# Patient Record
Sex: FEMALE | Race: WHITE | NOT HISPANIC OR LATINO | Employment: FULL TIME | URBAN - METROPOLITAN AREA
[De-identification: names, ages, dates, MRNs, and addresses within clinical notes are randomized per-mention and may not be internally consistent; named-entity substitution may affect disease eponyms.]

---

## 2017-01-11 ENCOUNTER — OFFICE VISIT (OUTPATIENT)
Dept: PLASTIC SURGERY | Facility: CLINIC | Age: 27
End: 2017-01-11
Payer: COMMERCIAL

## 2017-01-11 VITALS
BODY MASS INDEX: 21.38 KG/M2 | DIASTOLIC BLOOD PRESSURE: 74 MMHG | SYSTOLIC BLOOD PRESSURE: 107 MMHG | TEMPERATURE: 98 F | HEART RATE: 69 BPM | WEIGHT: 116.19 LBS | HEIGHT: 62 IN

## 2017-01-11 DIAGNOSIS — Z09 SURGERY FOLLOW-UP EXAMINATION: Primary | ICD-10-CM

## 2017-01-11 PROCEDURE — 99024 POSTOP FOLLOW-UP VISIT: CPT | Mod: S$GLB,,, | Performed by: SURGERY

## 2017-01-11 PROCEDURE — 99999 PR PBB SHADOW E&M-EST. PATIENT-LVL III: CPT | Mod: PBBFAC,,, | Performed by: SURGERY

## 2017-01-11 NOTE — PROGRESS NOTES
Roxana Perez presents after having a bilateral capsulorrhaphy and free fat   grafting.  She has done very, very well.  She does have one slight soft tissue   contracture up on the left lateral portion of the breast.  When she has her   nipples done in June, which she would like, we may indeed have to go ahead at   that time and go ahead and release that scar contracture.      CHANO/LACHO  dd: 01/11/2017 16:30:48 (CST)  td: 01/12/2017 14:45:38 (CST)  Doc ID   #[id number]  Job ID #[id number]    CC:

## 2017-01-16 ENCOUNTER — PATIENT MESSAGE (OUTPATIENT)
Dept: PLASTIC SURGERY | Facility: CLINIC | Age: 27
End: 2017-01-16

## 2017-02-23 ENCOUNTER — HOSPITAL ENCOUNTER (OUTPATIENT)
Dept: RADIOLOGY | Facility: HOSPITAL | Age: 27
Discharge: HOME | End: 2017-02-23
Attending: OBSTETRICS & GYNECOLOGY
Payer: COMMERCIAL

## 2017-02-23 DIAGNOSIS — Z15.09 BRCA2 POSITIVE: ICD-10-CM

## 2017-02-23 DIAGNOSIS — Z15.01 BRCA2 POSITIVE: ICD-10-CM

## 2017-02-23 PROCEDURE — 76856 US EXAM PELVIC COMPLETE: CPT | Mod: 26,,, | Performed by: RADIOLOGY

## 2017-02-23 PROCEDURE — 76856 US EXAM PELVIC COMPLETE: CPT | Mod: TC

## 2017-02-23 PROCEDURE — 76830 TRANSVAGINAL US NON-OB: CPT | Mod: 26,,, | Performed by: RADIOLOGY

## 2017-03-13 ENCOUNTER — PATIENT MESSAGE (OUTPATIENT)
Dept: PLASTIC SURGERY | Facility: CLINIC | Age: 27
End: 2017-03-13

## 2017-03-18 ENCOUNTER — PATIENT MESSAGE (OUTPATIENT)
Dept: PLASTIC SURGERY | Facility: CLINIC | Age: 27
End: 2017-03-18

## 2017-03-23 ENCOUNTER — PATIENT MESSAGE (OUTPATIENT)
Dept: PLASTIC SURGERY | Facility: CLINIC | Age: 27
End: 2017-03-23

## 2017-03-24 ENCOUNTER — TELEPHONE (OUTPATIENT)
Dept: PLASTIC SURGERY | Facility: CLINIC | Age: 27
End: 2017-03-24

## 2017-03-27 ENCOUNTER — TELEPHONE (OUTPATIENT)
Dept: PLASTIC SURGERY | Facility: CLINIC | Age: 27
End: 2017-03-27

## 2017-03-27 NOTE — TELEPHONE ENCOUNTER
Called home number listed in chart and spoke to her , Duane. Tried calling patients cell but no answered. LM for her to call me back at her convenience.

## 2017-03-28 ENCOUNTER — TELEPHONE (OUTPATIENT)
Dept: PLASTIC SURGERY | Facility: CLINIC | Age: 27
End: 2017-03-28

## 2017-03-28 NOTE — TELEPHONE ENCOUNTER
Spoke with patient. Questions regarding last procedure on 12/12/2017 and bill received for Umbilicoplasty. Have provided her with Olivia Estrada's number, our patient , for further questions as I cannot assist with billing. Will update Dr. Ty Padilla  And Olivia in this phone conversation for further clarification

## 2018-01-25 ENCOUNTER — PATIENT MESSAGE (OUTPATIENT)
Dept: PLASTIC SURGERY | Facility: CLINIC | Age: 28
End: 2018-01-25

## 2018-02-19 ENCOUNTER — HOSPITAL ENCOUNTER (OUTPATIENT)
Dept: RADIOLOGY | Facility: HOSPITAL | Age: 28
Discharge: HOME | End: 2018-02-19
Attending: NURSE PRACTITIONER
Payer: COMMERCIAL

## 2018-02-19 ENCOUNTER — OFFICE VISIT (OUTPATIENT)
Dept: GYNECOLOGIC ONCOLOGY | Facility: CLINIC | Age: 28
End: 2018-02-19
Payer: COMMERCIAL

## 2018-02-19 VITALS
DIASTOLIC BLOOD PRESSURE: 58 MMHG | WEIGHT: 114.88 LBS | BODY MASS INDEX: 21.01 KG/M2 | SYSTOLIC BLOOD PRESSURE: 120 MMHG | HEART RATE: 70 BPM

## 2018-02-19 DIAGNOSIS — Z01.419 WELL WOMAN EXAM: ICD-10-CM

## 2018-02-19 DIAGNOSIS — Z15.09 BRCA2 POSITIVE: Primary | ICD-10-CM

## 2018-02-19 DIAGNOSIS — Z15.09 BRCA2 POSITIVE: ICD-10-CM

## 2018-02-19 DIAGNOSIS — Z15.01 BRCA2 POSITIVE: ICD-10-CM

## 2018-02-19 DIAGNOSIS — Z15.01 BRCA2 POSITIVE: Primary | ICD-10-CM

## 2018-02-19 PROCEDURE — 76856 US EXAM PELVIC COMPLETE: CPT | Mod: 26,,, | Performed by: RADIOLOGY

## 2018-02-19 PROCEDURE — 88175 CYTOPATH C/V AUTO FLUID REDO: CPT

## 2018-02-19 PROCEDURE — 99999 PR PBB SHADOW E&M-EST. PATIENT-LVL III: CPT | Mod: PBBFAC,,, | Performed by: OBSTETRICS & GYNECOLOGY

## 2018-02-19 PROCEDURE — 76830 TRANSVAGINAL US NON-OB: CPT | Mod: 26,,, | Performed by: RADIOLOGY

## 2018-02-19 PROCEDURE — 99395 PREV VISIT EST AGE 18-39: CPT | Mod: S$GLB,,, | Performed by: OBSTETRICS & GYNECOLOGY

## 2018-02-19 PROCEDURE — 76830 TRANSVAGINAL US NON-OB: CPT | Mod: TC

## 2018-02-19 RX ORDER — NORETHINDRONE ACETATE AND ETHINYL ESTRADIOL 1MG-20(21)
1 KIT ORAL DAILY
Qty: 30 TABLET | Refills: 11 | Status: SHIPPED | OUTPATIENT
Start: 2018-02-19 | End: 2018-05-18

## 2018-02-19 NOTE — PROGRESS NOTES
Subjective:      Patient ID: Roxana Perez is a 27 y.o. female.    Chief Complaint: BRCA 2 positive (f/u)      HPI  Patient diagnosed with BRCA2 gene mutation 5/13 in [place]. Has since moved here, her  is in graduate school for music at [employer] and she is in PA school, both doing well. Has been extensively counseled and had breast risk reducing surgery. Sister at age 28 had the procedure performed already.     Had left ovarian cyst on u/s in feb with normal =68. , f/u u/s 4/15 showed comparison is made with February 16, 2015. The uterus measures 6.5 2.3 x 4.5 cm and is unchanged.. The endometrium measures 5 mm which is normal size. Within the uterus there are no mass lesions. . The right ovary measures4.1 x 3.1 by 4.4 cm. there is now a new complex mass measuring 4.5 x 2.8 by 4. 3 cm which is new since previous examination probably represents a hemorrhagic cyst. The present described left large mass lesion has resolved and there is a smaller complex 1.9 x 1.6 x 1.8 cm lesion remaining. Since this has decreased so significantly is is most likely to be benign. The left ovary measures 1.0 by 2.4 x 2.8 cm. . Arterial and venous flow are noted bilaterally. There is no evidence of ascites.      On OCPs.     Pap 5/14/15 normal.     2/23/17 follow up:    normal 12  Pelvic US The uterus measures 7.2 cm in length and 2.5 x 5 point cm in transverse dimensions.  The endometrium is normal thickness at 0.2 cm.   No discrete uterine fibroids identified.  The ovaries are normal in size and appearance.  The right ovary measures 3.3 x 1.3 x 1.7 cm.  The left ovary measures 3.7 x 1.9 x 3.1 cm. LEFT ovarian cyst measuring 3.6 x 1.7 x 2.8 cm, unilocular identified, likely physiologic.    Presents today for follow up. 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 Otherwise feels well.      Review of Systems   Constitutional: Negative for appetite change,  chills, fatigue and fever.   HENT: Negative for mouth sores.    Respiratory: Negative for cough and shortness of breath.    Cardiovascular: Negative for leg swelling.   Gastrointestinal: Negative for abdominal pain, blood in stool, constipation and diarrhea.   Endocrine: Negative for cold intolerance.   Genitourinary: Negative for dysuria and vaginal bleeding.   Musculoskeletal: Negative for myalgias.   Skin: Negative for rash.   Allergic/Immunologic: Negative.    Neurological: Negative for weakness and numbness.   Hematological: Negative for adenopathy. Does not bruise/bleed easily.   Psychiatric/Behavioral: Negative for confusion.       Objective:   Physical Exam:   Constitutional: She is oriented to person, place, and time. She appears well-developed and well-nourished.    HENT:   Head: Normocephalic and atraumatic.    Eyes: EOM are normal. Pupils are equal, round, and reactive to light.    Neck: Normal range of motion. Neck supple. No thyromegaly present.    Cardiovascular: Normal rate, regular rhythm and intact distal pulses.     Pulmonary/Chest: Effort normal and breath sounds normal. No respiratory distress. She has no wheezes.        Abdominal: Soft. Bowel sounds are normal. She exhibits no distension, no ascites and no mass. There is no tenderness.     Genitourinary: Rectum normal, vagina normal and uterus normal. Pelvic exam was performed with patient supine. There is no lesion on the right labia. There is no lesion on the left labia. Uterus is not fixed. Cervix is normal. Right adnexum displays no mass and no fullness. Left adnexum displays no mass and no fullness. Additional cervical findings: pap smear done          Musculoskeletal: Normal range of motion and moves all extremeties.      Lymphadenopathy:     She has no cervical adenopathy.        Right: No inguinal and no supraclavicular adenopathy present.        Left: No inguinal and no supraclavicular adenopathy present.    Neurological: She is alert  and oriented to person, place, and time.    Skin: Skin is warm and dry. No rash noted.    Psychiatric: She has a normal mood and affect.       Assessment:     1. BRCA2 positive    2. Well woman exam        Plan:     Orders Placed This Encounter   Procedures    US Pelvis Complete Non OB         - pap  -   - pelvic US    - RTC 6 months or sooner if needed   - continue OCPs for chemoprophylaxis until ready for childbearing, will change to monophasic formula to try and help with breakthrough bleeding

## 2018-04-10 ENCOUNTER — PATIENT MESSAGE (OUTPATIENT)
Dept: GYNECOLOGIC ONCOLOGY | Facility: CLINIC | Age: 28
End: 2018-04-10

## 2018-05-15 ENCOUNTER — PATIENT MESSAGE (OUTPATIENT)
Dept: GYNECOLOGIC ONCOLOGY | Facility: CLINIC | Age: 28
End: 2018-05-15

## 2018-05-18 ENCOUNTER — TELEPHONE (OUTPATIENT)
Dept: GYNECOLOGIC ONCOLOGY | Facility: CLINIC | Age: 28
End: 2018-05-18

## 2018-05-18 DIAGNOSIS — Z30.9 ENCOUNTER FOR CONTRACEPTIVE MANAGEMENT, UNSPECIFIED TYPE: Primary | ICD-10-CM

## 2018-05-18 RX ORDER — NORETHINDRONE AND ETHINYL ESTRADIOL AND FERROUS FUMARATE 0.4-35(21)
1 KIT ORAL DAILY
Qty: 28 EACH | Refills: 3 | Status: SHIPPED | OUTPATIENT
Start: 2018-05-18 | End: 2018-05-21 | Stop reason: SDUPTHER

## 2018-05-18 NOTE — TELEPHONE ENCOUNTER
Spoke with pt. Per Dr. Dyson, pt advised birth control ha been sent to the pharmacy. She voiced understanding

## 2018-05-18 NOTE — TELEPHONE ENCOUNTER
Spoke with pt. Pt states she will like to have birth control sent to pharmacy on file. Pt advised Dr. Dyson is in surgery, message will be forward to physician. She voiced understanding

## 2018-05-18 NOTE — TELEPHONE ENCOUNTER
----- Message from Kristen Hughes sent at 5/18/2018  8:57 AM CDT -----  Contact: neymar            Name of Who is Calling: neymar      What is the request in detail: pt wants to discuss birth control with the doctor. Please call pt      Can the clinic reply by [facility]: no      What [facility] Back [facility]: [phone]

## 2018-05-18 NOTE — TELEPHONE ENCOUNTER
----- Message from Saira Dyson MD sent at 5/18/2018 11:03 AM CDT -----  Contact: neymar Nick sent.   ----- Message -----  From: Chantelle Zaidi MA  Sent: 5/18/2018   9:43 AM  To: Saira Dyson MD    Good morning,    Spoke with pt. Pt states she will like to have birth control sent to pharmacy on file.       ----- Message -----  From: Kristen Hughes  Sent: 5/18/2018   8:57 AM  To: Kiel Chávez Staff              Name of Who is Calling: neymar      What is the request in detail: pt wants to discuss birth control with the doctor. Please call pt      Can the clinic reply by [facility]: no      What Number to Call Back if not in [facility]: [phone]

## 2018-05-21 DIAGNOSIS — Z30.9 ENCOUNTER FOR CONTRACEPTIVE MANAGEMENT, UNSPECIFIED TYPE: ICD-10-CM

## 2018-05-21 RX ORDER — NORETHINDRONE AND ETHINYL ESTRADIOL AND FERROUS FUMARATE 0.4-35(21)
1 KIT ORAL DAILY
Qty: 28 EACH | Refills: 3 | Status: SHIPPED | OUTPATIENT
Start: 2018-05-21 | End: 2018-08-27 | Stop reason: SDUPTHER

## 2018-08-09 ENCOUNTER — TELEPHONE (OUTPATIENT)
Dept: GYNECOLOGIC ONCOLOGY | Facility: CLINIC | Age: 28
End: 2018-08-09

## 2018-08-24 ENCOUNTER — TELEPHONE (OUTPATIENT)
Dept: GYNECOLOGIC ONCOLOGY | Facility: CLINIC | Age: 28
End: 2018-08-24

## 2018-08-27 ENCOUNTER — OFFICE VISIT (OUTPATIENT)
Dept: GYNECOLOGIC ONCOLOGY | Facility: CLINIC | Age: 28
End: 2018-08-27
Payer: COMMERCIAL

## 2018-08-27 ENCOUNTER — LAB VISIT (OUTPATIENT)
Dept: LAB | Facility: HOSPITAL | Age: 28
End: 2018-08-27
Attending: OBSTETRICS & GYNECOLOGY
Payer: COMMERCIAL

## 2018-08-27 VITALS
BODY MASS INDEX: 20.4 KG/M2 | HEIGHT: 62 IN | DIASTOLIC BLOOD PRESSURE: 57 MMHG | SYSTOLIC BLOOD PRESSURE: 109 MMHG | WEIGHT: 110.88 LBS | HEART RATE: 52 BPM

## 2018-08-27 DIAGNOSIS — Z15.01 BRCA GENE POSITIVE: ICD-10-CM

## 2018-08-27 DIAGNOSIS — Z15.01 BRCA GENE POSITIVE: Primary | ICD-10-CM

## 2018-08-27 DIAGNOSIS — Z15.09 BRCA GENE POSITIVE: ICD-10-CM

## 2018-08-27 DIAGNOSIS — Z15.09 BRCA GENE POSITIVE: Primary | ICD-10-CM

## 2018-08-27 DIAGNOSIS — Z30.9 ENCOUNTER FOR CONTRACEPTIVE MANAGEMENT, UNSPECIFIED TYPE: ICD-10-CM

## 2018-08-27 LAB — CANCER AG125 SERPL-ACNC: 14 U/ML

## 2018-08-27 PROCEDURE — 36415 COLL VENOUS BLD VENIPUNCTURE: CPT

## 2018-08-27 PROCEDURE — 99999 PR PBB SHADOW E&M-EST. PATIENT-LVL III: CPT | Mod: PBBFAC,,, | Performed by: OBSTETRICS & GYNECOLOGY

## 2018-08-27 PROCEDURE — 99214 OFFICE O/P EST MOD 30 MIN: CPT | Mod: S$GLB,,, | Performed by: OBSTETRICS & GYNECOLOGY

## 2018-08-27 PROCEDURE — 3008F BODY MASS INDEX DOCD: CPT | Mod: CPTII,S$GLB,, | Performed by: OBSTETRICS & GYNECOLOGY

## 2018-08-27 PROCEDURE — 86304 IMMUNOASSAY TUMOR CA 125: CPT

## 2018-08-27 RX ORDER — NORETHINDRONE AND ETHINYL ESTRADIOL AND FERROUS FUMARATE 0.4-35(21)
1 KIT ORAL DAILY
Qty: 28 EACH | Refills: 5 | Status: SHIPPED | OUTPATIENT
Start: 2018-08-27 | End: 2019-03-04 | Stop reason: SDUPTHER

## 2018-09-09 NOTE — PROGRESS NOTES
Subjective:      Patient ID: Roxana Perez is a 28 y.o. female.    Chief Complaint: Follow-up      HPI  Patient diagnosed with BRCA2 gene mutation 5/13 in [place]. Moved to [facility], her  is in graduate school for music at [employer] and she is in PA school, both doing well. Has been extensively counseled and had breast risk reducing surgery. Sister at age 28 had the procedure performed already.     Had left ovarian cyst on u/s in feb with normal =75. , f/u u/s 4/15 showed comparison is made with February 16, 2015. The uterus measures 6.5 2.3 x 4.5 cm and is unchanged.. The endometrium measures 5 mm which is normal size. Within the uterus there are no mass lesions. . The right ovary measures4.1 x 3.1 by 4.4 cm. there is now a new complex mass measuring 4.5 x 2.8 by 4. 3 cm which is new since previous examination probably represents a hemorrhagic cyst. The present described left large mass lesion has resolved and there is a smaller complex 1.9 x 1.6 x 1.8 cm lesion remaining. Since this has decreased so significantly is is most likely to be benign. The left ovary measures 1.0 by 2.4 x 2.8 cm. . Arterial and venous flow are noted bilaterally. There is no evidence of ascites.      On OCPs.      Pap 5/14/15 normal.   Pap 2/2018 normal.      2/23/17 follow up:    normal 12  Pelvic US The uterus measures 7.2 cm in length and 2.5 x 5 point cm in transverse dimensions.  The endometrium is normal thickness at 0.2 cm.   No discrete uterine fibroids identified.  The ovaries are normal in size and appearance.  The right ovary measures 3.3 x 1.3 x 1.7 cm.  The left ovary measures 3.7 x 1.9 x 3.1 cm. LEFT ovarian cyst measuring 3.6 x 1.7 x 2.8 cm, unilocular identified, likely physiologic.    S/p bilateral mastectomy     Presents today for follow up. 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 .ksw     Review of Systems   Constitutional: Negative for appetite change, chills,  fatigue and fever.   HENT: Negative for mouth sores.    Respiratory: Negative for cough and shortness of breath.    Cardiovascular: Negative for leg swelling.   Gastrointestinal: Negative for abdominal pain, blood in stool, constipation and diarrhea.   Endocrine: Negative for cold intolerance.   Genitourinary: Negative for dysuria and vaginal bleeding.   Musculoskeletal: Negative for myalgias.   Skin: Negative for rash.   Allergic/Immunologic: Negative.    Neurological: Negative for weakness and numbness.   Hematological: Negative for adenopathy. Does not bruise/bleed easily.   Psychiatric/Behavioral: Negative for confusion.       Objective:   Physical Exam:   Constitutional: She is oriented to person, place, and time. She appears well-developed and well-nourished.    HENT:   Head: Normocephalic and atraumatic.    Eyes: EOM are normal. Pupils are equal, round, and reactive to light.    Neck: Normal range of motion. Neck supple. No thyromegaly present.    Cardiovascular: Normal rate, regular rhythm and intact distal pulses.     Pulmonary/Chest: Effort normal and breath sounds normal. No respiratory distress. She has no wheezes.        Abdominal: Soft. Bowel sounds are normal. She exhibits no distension, no ascites and no mass. There is no tenderness.     Genitourinary: Rectum normal, vagina normal and uterus normal. Pelvic exam was performed with patient supine. There is no lesion on the right labia. There is no lesion on the left labia. Cervix is normal. Right adnexum displays no mass. Left adnexum displays no mass.           Musculoskeletal: Normal range of motion and moves all extremeties.      Lymphadenopathy:     She has no cervical adenopathy.        Right: No inguinal and no supraclavicular adenopathy present.        Left: No inguinal and no supraclavicular adenopathy present.    Neurological: She is alert and oriented to person, place, and time.    Skin: Skin is warm and dry. No rash noted.    Psychiatric:  She has a normal mood and affect.       Assessment:     1. BRCA gene positive    2. Encounter for contraceptive management, unspecified type        Plan:     Orders Placed This Encounter   Procedures    US Pelvis Comp with Transvag NON-OB (xpd)         Plan for continued surveillance.   OCPs for chemoprophylaxis while not pursuing fertility.   RTC 6 months with  and US or sooner if needed.     I spent approximately 25 minutes reviewing the available records and evaluating the patient, out of which over 50% of the time was spent face to face with the patient in counseling and coordinating this patient's care.

## 2018-09-11 ENCOUNTER — HOSPITAL ENCOUNTER (OUTPATIENT)
Dept: RADIOLOGY | Facility: HOSPITAL | Age: 28
Discharge: HOME | End: 2018-09-11
Attending: OBSTETRICS & GYNECOLOGY
Payer: COMMERCIAL

## 2018-09-11 DIAGNOSIS — Z15.09 BRCA GENE POSITIVE: ICD-10-CM

## 2018-09-11 DIAGNOSIS — Z15.01 BRCA GENE POSITIVE: ICD-10-CM

## 2018-09-11 PROCEDURE — 76830 TRANSVAGINAL US NON-OB: CPT | Mod: TC

## 2018-09-11 PROCEDURE — 76856 US EXAM PELVIC COMPLETE: CPT | Mod: 26,,, | Performed by: RADIOLOGY

## 2018-09-11 PROCEDURE — 76830 TRANSVAGINAL US NON-OB: CPT | Mod: 26,,, | Performed by: RADIOLOGY

## 2018-10-04 ENCOUNTER — PATIENT MESSAGE (OUTPATIENT)
Dept: PLASTIC SURGERY | Facility: CLINIC | Age: 28
End: 2018-10-04

## 2018-10-17 ENCOUNTER — OFFICE VISIT (OUTPATIENT)
Dept: PLASTIC SURGERY | Facility: CLINIC | Age: 28
End: 2018-10-17
Payer: COMMERCIAL

## 2018-10-17 VITALS
BODY MASS INDEX: 21.51 KG/M2 | HEART RATE: 69 BPM | TEMPERATURE: 98 F | RESPIRATION RATE: 16 BRPM | DIASTOLIC BLOOD PRESSURE: 69 MMHG | SYSTOLIC BLOOD PRESSURE: 105 MMHG | WEIGHT: 116.88 LBS | HEIGHT: 62 IN

## 2018-10-17 DIAGNOSIS — Z85.3 PERSONAL HISTORY OF MALIGNANT NEOPLASM OF BREAST: Primary | ICD-10-CM

## 2018-10-17 PROCEDURE — 99999 PR PBB SHADOW E&M-EST. PATIENT-LVL III: CPT | Mod: PBBFAC,,, | Performed by: PHYSICIAN ASSISTANT

## 2018-10-17 PROCEDURE — 99213 OFFICE O/P EST LOW 20 MIN: CPT | Mod: S$GLB,,, | Performed by: PHYSICIAN ASSISTANT

## 2018-10-17 PROCEDURE — 3008F BODY MASS INDEX DOCD: CPT | Mod: CPTII,S$GLB,, | Performed by: PHYSICIAN ASSISTANT

## 2018-10-17 NOTE — PROGRESS NOTES
[facility]oxana Perez [facility] for a follow up visit status post B breast reconstruction following B mastectoym secondary to +BRCA mutation.     Review of patient's allergies indicates:   Allergen Reactions    Bactrim [sulfamethoxazole-trimethoprim] Hives    Clindamycin Hives     Current Outpatient Medications on File Prior to Visit   Medication Sig Dispense Refill    LACTOBACILLUS ACIDOPHILUS (PROBIOTIC ORAL) Take 2 capsules by mouth nightly.      noreth-ethinyl estradiol-iron 0.4mg-35mcg(21) and 75 mg (7) Chew Take 1 tablet by mouth once daily. 28 each 5     No current facility-administered medications on file prior to visit.      Patient Active Problem List   Diagnosis    Chromosomal hereditary disorder    Breast cancer screening, high risk patient    BRCA2 positive    Pre-op testing    Encounter for breast reconstruction following mastectomy    H/O mastectomy    Birth control    BRCA gene positive    Well woman exam     Past Surgical History:   Procedure Laterality Date    BREAST SURGERY  12/07/2015    becca. breast-Mastectomy & Exchange Implant Breast Implant    CAPSULORRHAPHY Bilateral 12/12/2016    [facility] OR 2ND FLR    EXCHANGE IMPLANT-BREAST Bilateral Implant Exchange 350-450cc x3 (Style20) 2hrs Bilateral 5/16/2016    Performed by [facility]illa MD at [facility] OR 2ND FLR    INSERTION-TISSUE EXPANDER Bilateral 12/7/2015    Performed by [facility] MD at [facility] OR 2ND FLR    LIPOSUCTION WITH FAT TRANSFER  becca breast Bilateral 12/12/2016    Performed [facility] at [facility] OR 2ND FLR    MASTECTOMY-BREAST-BILATERAL Bilateral 12/7/2015    Performed by Hira Jean-Baptiste MD at [facility] OR 2ND FLR    MOUTH SURGERY      wisdom teeth extracted    RECONSTRUCTION-BREAST Bilateral 12/7/2015    Performed [facility] at [facility] OR 2ND FLR    REPAIR-HERNIA-UMBILICAL N/A 5/16/2016     Performed by [facility]illa MD at [facility] OR [address] [place] [address]     PHYSICAL EXAMINATION  Vitals:    10/17/18 1053   BP: 105/69   Pulse: 69   Resp: 16   Temp: 98.4 °F (36.9 °C)     WD WN NAD  VSS  Normal resp effort  R breast - mastectomy incisions well healed, implants soft/intact, surgically absent nipple  L Breast - mastectomy incisions well healed, implants soft/intact, surgically absent nipple      ASSESSMENT/PLAN  28 y.o. F s/p B implant based breast reconstruction  - Doing well, no issues. Breast are soft and symmetric  - Long discussion regarding nipple reconstruction vs 3D nipple tattoo. Patient would like to consider her options and get back with us regarding her decision  - Patient to send message via [facility].     All questions were answered. The patient was advised to call the clinic with any questions or concerns prior to their next visit.

## 2018-11-02 ENCOUNTER — PATIENT MESSAGE (OUTPATIENT)
Dept: PLASTIC SURGERY | Facility: CLINIC | Age: 28
End: 2018-11-02

## 2019-02-20 ENCOUNTER — TELEPHONE (OUTPATIENT)
Dept: GYNECOLOGIC ONCOLOGY | Facility: CLINIC | Age: 29
End: 2019-02-20

## 2019-03-01 ENCOUNTER — TELEPHONE (OUTPATIENT)
Dept: GYNECOLOGIC ONCOLOGY | Facility: CLINIC | Age: 29
End: 2019-03-01

## 2019-03-04 ENCOUNTER — OFFICE VISIT (OUTPATIENT)
Dept: GYNECOLOGIC ONCOLOGY | Facility: CLINIC | Age: 29
End: 2019-03-04
Payer: COMMERCIAL

## 2019-03-04 ENCOUNTER — LAB VISIT (OUTPATIENT)
Dept: LAB | Facility: HOSPITAL | Age: 29
End: 2019-03-04
Attending: OBSTETRICS & GYNECOLOGY
Payer: COMMERCIAL

## 2019-03-04 VITALS
DIASTOLIC BLOOD PRESSURE: 64 MMHG | WEIGHT: 117.06 LBS | SYSTOLIC BLOOD PRESSURE: 118 MMHG | HEIGHT: 62 IN | HEART RATE: 68 BPM | BODY MASS INDEX: 21.54 KG/M2

## 2019-03-04 DIAGNOSIS — Z15.01 BRCA POSITIVE: Primary | ICD-10-CM

## 2019-03-04 DIAGNOSIS — Z15.09 BRCA POSITIVE: ICD-10-CM

## 2019-03-04 DIAGNOSIS — Z15.09 BRCA2 POSITIVE: ICD-10-CM

## 2019-03-04 DIAGNOSIS — Z15.09 BRCA POSITIVE: Primary | ICD-10-CM

## 2019-03-04 DIAGNOSIS — Z15.01 BRCA2 POSITIVE: ICD-10-CM

## 2019-03-04 DIAGNOSIS — Z30.9 ENCOUNTER FOR CONTRACEPTIVE MANAGEMENT, UNSPECIFIED TYPE: ICD-10-CM

## 2019-03-04 DIAGNOSIS — Z15.01 BRCA POSITIVE: ICD-10-CM

## 2019-03-04 LAB — CANCER AG125 SERPL-ACNC: 11 U/ML

## 2019-03-04 PROCEDURE — 86304 IMMUNOASSAY TUMOR CA 125: CPT

## 2019-03-04 PROCEDURE — 99999 PR PBB SHADOW E&M-EST. PATIENT-LVL III: CPT | Mod: PBBFAC,,, | Performed by: OBSTETRICS & GYNECOLOGY

## 2019-03-04 PROCEDURE — 99999 PR PBB SHADOW E&M-EST. PATIENT-LVL III: ICD-10-PCS | Mod: PBBFAC,,, | Performed by: OBSTETRICS & GYNECOLOGY

## 2019-03-04 PROCEDURE — 99214 PR OFFICE/OUTPT VISIT, EST, LEVL IV, 30-39 MIN: ICD-10-PCS | Mod: S$GLB,,, | Performed by: OBSTETRICS & GYNECOLOGY

## 2019-03-04 PROCEDURE — 36415 COLL VENOUS BLD VENIPUNCTURE: CPT

## 2019-03-04 PROCEDURE — 3008F PR BODY MASS INDEX (BMI) DOCUMENTED: ICD-10-PCS | Mod: CPTII,S$GLB,, | Performed by: OBSTETRICS & GYNECOLOGY

## 2019-03-04 PROCEDURE — 3008F BODY MASS INDEX DOCD: CPT | Mod: CPTII,S$GLB,, | Performed by: OBSTETRICS & GYNECOLOGY

## 2019-03-04 PROCEDURE — 99214 OFFICE O/P EST MOD 30 MIN: CPT | Mod: S$GLB,,, | Performed by: OBSTETRICS & GYNECOLOGY

## 2019-03-04 RX ORDER — NORETHINDRONE AND ETHINYL ESTRADIOL AND FERROUS FUMARATE 0.4-35(21)
1 KIT ORAL DAILY
Qty: 28 EACH | Refills: 5 | Status: SHIPPED | OUTPATIENT
Start: 2019-03-04 | End: 2019-03-25

## 2019-03-04 NOTE — PROGRESS NOTES
Subjective:      Patient ID: Roxana Perez is a 28 y.o. female.    Chief Complaint: BRCA 2 gene positive (6 mth )      HPI  Patient diagnosed with BRCA2 gene mutation 5/13 in [place]. Moved to [facility], her  is in graduate school for music at [employer] and she is in PA school, both doing well. Has been extensively counseled and had breast risk reducing surgery. Sister at age 28 had the procedure performed already.     Had left ovarian cyst on u/s in feb with normal =93. , f/u u/s 4/15 showed comparison is made with February 16, 2015. The uterus measures 6.5 2.3 x 4.5 cm and is unchanged.. The endometrium measures 5 mm which is normal size. Within the uterus there are no mass lesions. . The right ovary measures4.1 x 3.1 by 4.4 cm. there is now a new complex mass measuring 4.5 x 2.8 by 4. 3 cm which is new since previous examination probably represents a hemorrhagic cyst. The present described left large mass lesion has resolved and there is a smaller complex 1.9 x 1.6 x 1.8 cm lesion remaining. Since this has decreased so significantly is is most likely to be benign. The left ovary measures 1.0 by 2.4 x 2.8 cm. . Arterial and venous flow are noted bilaterally. There is no evidence of ascites.      On OCPs.      Pap 5/14/15 normal.   Pap 2/2018 normal.      2/23/17 follow up:    normal 12  Pelvic US The uterus measures 7.2 cm in length and 2.5 x 5 point cm in transverse dimensions.  The endometrium is normal thickness at 0.2 cm.   No discrete uterine fibroids identified.  The ovaries are normal in size and appearance.  The right ovary measures 3.3 x 1.3 x 1.7 cm.  The left ovary measures 3.7 x 1.9 x 3.1 cm. LEFT ovarian cyst measuring 3.6 x 1.7 x 2.8 cm, unilocular identified, likely physiologic.     S/p bilateral mastectomy  8/2018 follow up:   normal 14   Pelvic US  Impression     No sonographic abnormality.     Presents today for follow up. 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  Moved to [facility]. Continues on OCPs.   Review of Systems   Constitutional: Negative for appetite change, chills, fatigue and fever.   HENT: Negative for mouth sores.    Respiratory: Negative for cough and shortness of breath.    Cardiovascular: Negative for leg swelling.   Gastrointestinal: Negative for abdominal pain, blood in stool, constipation and diarrhea.   Endocrine: Negative for cold intolerance.   Genitourinary: Negative for dysuria and vaginal bleeding.   Musculoskeletal: Negative for myalgias.   Skin: Negative for rash.   Allergic/Immunologic: Negative.    Neurological: Negative for weakness and numbness.   Hematological: Negative for adenopathy. Does not bruise/bleed easily.   Psychiatric/Behavioral: Negative for confusion.       Objective:   Physical Exam:   Constitutional: She is oriented to person, place, and time. She appears well-developed and well-nourished.    HENT:   Head: Normocephalic and atraumatic.    Eyes: EOM are normal. Pupils are equal, round, and reactive to light.    Neck: Normal range of motion. Neck supple. No thyromegaly present.    Cardiovascular: Normal rate, regular rhythm and intact distal pulses.     Pulmonary/Chest: Effort normal and breath sounds normal. No respiratory distress. She has no wheezes.        Abdominal: Soft. Bowel sounds are normal. She exhibits no distension, no ascites and no mass. There is no tenderness.     Genitourinary: Rectum normal, vagina normal and uterus normal. Pelvic exam was performed with patient supine. There is no lesion on the right labia. There is no lesion on the left labia. Cervix is normal. Right adnexum displays no mass. Left adnexum displays no mass.           Musculoskeletal: Normal range of motion and moves all extremeties.      Lymphadenopathy:     She has no cervical adenopathy.        Right: No inguinal and no supraclavicular adenopathy present.        Left: No inguinal and no supraclavicular adenopathy present.    Neurological: She  is alert and oriented to person, place, and time.    Skin: Skin is warm and dry. No rash noted.    Psychiatric: She has a normal mood and affect.       Assessment:     1. BRCA positive    2. Encounter for contraceptive management, unspecified type    3. BRCA2 positive        Plan:        Plan for continued surveillance.  and pelvic US ordered today.   OCPs for chemoprophylaxis while not pursuing fertility.   RTC 6 months with  and US or sooner if needed.

## 2019-03-13 ENCOUNTER — HOSPITAL ENCOUNTER (OUTPATIENT)
Dept: RADIOLOGY | Facility: HOSPITAL | Age: 29
Discharge: HOME | End: 2019-03-13
Attending: OBSTETRICS & GYNECOLOGY
Payer: COMMERCIAL

## 2019-03-13 DIAGNOSIS — Z15.01 BRCA POSITIVE: ICD-10-CM

## 2019-03-13 DIAGNOSIS — Z15.09 BRCA POSITIVE: ICD-10-CM

## 2019-03-13 PROCEDURE — 76830 US PELVIS COMP WITH TRANSVAG NON-OB (XPD): ICD-10-PCS | Mod: 26,,, | Performed by: RADIOLOGY

## 2019-03-13 PROCEDURE — 76856 US PELVIS COMP WITH TRANSVAG NON-OB (XPD): ICD-10-PCS | Mod: 26,,, | Performed by: RADIOLOGY

## 2019-03-13 PROCEDURE — 76830 TRANSVAGINAL US NON-OB: CPT | Mod: TC

## 2019-03-13 PROCEDURE — 76856 US EXAM PELVIC COMPLETE: CPT | Mod: 26,,, | Performed by: RADIOLOGY

## 2019-03-13 PROCEDURE — 76830 TRANSVAGINAL US NON-OB: CPT | Mod: 26,,, | Performed by: RADIOLOGY

## 2019-03-25 ENCOUNTER — TELEPHONE (OUTPATIENT)
Dept: GYNECOLOGIC ONCOLOGY | Facility: CLINIC | Age: 29
End: 2019-03-25

## 2019-03-25 ENCOUNTER — TELEPHONE (OUTPATIENT)
Dept: GYNECOLOGIC ONCOLOGY | Facility: HOSPITAL | Age: 29
End: 2019-03-25

## 2019-03-25 DIAGNOSIS — Z30.9 ENCOUNTER FOR CONTRACEPTIVE MANAGEMENT, UNSPECIFIED TYPE: ICD-10-CM

## 2019-03-25 RX ORDER — NORETHINDRONE AND ETHINYL ESTRADIOL AND FERROUS FUMARATE 0.4-35(21)
1 KIT ORAL DAILY
Qty: 28 EACH | Refills: 5 | Status: SHIPPED | OUTPATIENT
Start: 2019-03-25 | End: 2019-09-08 | Stop reason: SDUPTHER

## 2019-03-25 NOTE — TELEPHONE ENCOUNTER
----- Message from Chantelle Zaidi MA sent at 3/25/2019  9:15 AM CDT -----  Pharmacy confirmed with pt.      ----- Message -----  From: Luna Ambrocio  Sent: 3/25/2019   8:57 AM  To: Kiel Chávez Staff    Name of Who is Calling: #MARCELLUSANGELINA [[id number]]    What is the request in detail: Pt wants birth control changed to [id number] - [place] - [address]    Can the clinic reply by [facility]:   No       What Number to Call Back if not in [facility]: [phone]

## 2019-03-25 NOTE — TELEPHONE ENCOUNTER
----- Message from Luna Ambrocio sent at 3/25/2019  8:57 AM CDT -----  Name of Who is Calling: #ANGELINA GERMAIN [[id number]]    What is the request in detail: Pt [facility] control [id number] [place] [facility] [address]    Can the clinic reply by [facility]:   No       What Number to Call Back if not in [facility]: [phone]

## 2019-03-25 NOTE — TELEPHONE ENCOUNTER
----- Message from Martha Monzon NP sent at 3/25/2019 11:45 AM CDT -----  Done, thanks    ----- Message -----  From: Chantelle Zaidi MA  Sent: 3/25/2019   9:15 AM  To: Martha Monzon NP    Pharmacy confirmed with pt.      ----- Message -----  From: Luna Ambrocio  Sent: 3/25/2019   8:57 AM  To: Kiel Chávez Staff    Name of Who is Calling: ANGELINA ALONZO [[id number]]    What is the request in detail: Pt wants birth control changed to [id number] - [place], [place] - [address]    Can the clinic reply by [facility]:   No       What Number to Call Back if not in [facility]: [phone]

## 2019-03-25 NOTE — TELEPHONE ENCOUNTER
Spoke with pt. Pt confirmed pharmacy where to send birth control. Pt informed message will be forward to Martha pierce, Dr. Dyson is not in clinic.

## 2019-09-03 ENCOUNTER — OFFICE VISIT (OUTPATIENT)
Dept: FAMILY MEDICINE | Facility: CLINIC | Age: 29
End: 2019-09-03
Attending: FAMILY MEDICINE
Payer: COMMERCIAL

## 2019-09-03 VITALS
BODY MASS INDEX: 21.57 KG/M2 | SYSTOLIC BLOOD PRESSURE: 102 MMHG | HEART RATE: 53 BPM | WEIGHT: 117.19 LBS | OXYGEN SATURATION: 98 % | DIASTOLIC BLOOD PRESSURE: 70 MMHG | HEIGHT: 62 IN

## 2019-09-03 DIAGNOSIS — Z71.84 ENCOUNTER FOR COUNSELING FOR TRAVEL: ICD-10-CM

## 2019-09-03 DIAGNOSIS — Z23 NEED FOR VACCINATION: ICD-10-CM

## 2019-09-03 DIAGNOSIS — Z00.00 ANNUAL PHYSICAL EXAM: Primary | ICD-10-CM

## 2019-09-03 LAB
BILIRUB SERPL-MCNC: NORMAL MG/DL
BLOOD URINE, POC: NORMAL
COLOR, POC UA: YELLOW
GLUCOSE UR QL STRIP: NORMAL
KETONES UR QL STRIP: NORMAL
LEUKOCYTE ESTERASE URINE, POC: NORMAL
NITRITE, POC UA: NORMAL
PH, POC UA: 7
PROTEIN, POC: NORMAL
SPECIFIC GRAVITY, POC UA: 1
UROBILINOGEN, POC UA: NORMAL

## 2019-09-03 PROCEDURE — 81001 POCT URINALYSIS, DIPSTICK OR TABLET REAGENT, AUTOMATED, WITH MICROSCOP: ICD-10-PCS | Mod: S$GLB,,, | Performed by: FAMILY MEDICINE

## 2019-09-03 PROCEDURE — 90471 TDAP VACCINE GREATER THAN OR EQUAL TO 7YO IM: ICD-10-PCS | Mod: S$GLB,,, | Performed by: FAMILY MEDICINE

## 2019-09-03 PROCEDURE — 99999 PR PBB SHADOW E&M-EST. PATIENT-LVL IV: CPT | Mod: PBBFAC,,, | Performed by: FAMILY MEDICINE

## 2019-09-03 PROCEDURE — 81001 URINALYSIS AUTO W/SCOPE: CPT | Mod: S$GLB,,, | Performed by: FAMILY MEDICINE

## 2019-09-03 PROCEDURE — 90471 IMMUNIZATION ADMIN: CPT | Mod: S$GLB,,, | Performed by: FAMILY MEDICINE

## 2019-09-03 PROCEDURE — 90715 TDAP VACCINE 7 YRS/> IM: CPT | Mod: S$GLB,,, | Performed by: FAMILY MEDICINE

## 2019-09-03 PROCEDURE — 99395 PR PREVENTIVE VISIT,EST,18-39: ICD-10-PCS | Mod: 25,S$GLB,, | Performed by: FAMILY MEDICINE

## 2019-09-03 PROCEDURE — 99999 PR PBB SHADOW E&M-EST. PATIENT-LVL IV: ICD-10-PCS | Mod: PBBFAC,,, | Performed by: FAMILY MEDICINE

## 2019-09-03 PROCEDURE — 99395 PREV VISIT EST AGE 18-39: CPT | Mod: 25,S$GLB,, | Performed by: FAMILY MEDICINE

## 2019-09-03 PROCEDURE — 90715 TDAP VACCINE GREATER THAN OR EQUAL TO 7YO IM: ICD-10-PCS | Mod: S$GLB,,, | Performed by: FAMILY MEDICINE

## 2019-09-03 NOTE — PROGRESS NOTES
Patient was given Tdap IM in Right Deltoid as per orders from MD. Aseptic tech used and pt tolerated well. Pt was monitored for 15 mins with no reaction noted.

## 2019-09-05 NOTE — PROGRESS NOTES
Subjective:       Patient ID: Roxana Perez is a 29 y.o. female.    Chief Complaint: Annual Exam    HPI   Pt is here for annual exam pt is well no sob/cp no change in bowel habits no brbpr   Review of Systems   Constitutional: Negative for activity change, chills, diaphoresis, fatigue, fever and unexpected weight change.   HENT: Negative for congestion, ear discharge, ear pain, hearing loss, postnasal drip, rhinorrhea, sinus pressure, sneezing, sore throat, trouble swallowing and voice change.    Eyes: Negative for photophobia, discharge, redness, itching and visual disturbance.   Respiratory: Negative for cough, chest tightness, shortness of breath and wheezing.    Cardiovascular: Negative for chest pain, palpitations and leg swelling.   Gastrointestinal: Negative for abdominal pain, anal bleeding, blood in stool, constipation, diarrhea, nausea, rectal pain and vomiting.   Endocrine: Negative for polydipsia and polyuria.   Genitourinary: Negative for difficulty urinating, dyspareunia, dysuria, flank pain, frequency, hematuria, menstrual problem, pelvic pain, urgency, vaginal bleeding and vaginal discharge.   Musculoskeletal: Negative for arthralgias, back pain, joint swelling and neck pain.   Skin: Negative for color change and rash.   Neurological: Negative for dizziness, speech difficulty, weakness, light-headedness, numbness and headaches.   Hematological: Does not bruise/bleed easily.   Psychiatric/Behavioral: Negative for agitation, confusion, decreased concentration, dysphoric mood, sleep disturbance and suicidal ideas. The patient is not nervous/anxious.        Objective:      Physical Exam   Constitutional: She appears well-developed and well-nourished.   HENT:   Head: Normocephalic and atraumatic.   Right Ear: External ear normal.   Left Ear: External ear normal.   Nose: Nose normal.   Mouth/Throat: Oropharynx is clear and moist.   Eyes: Pupils are equal, round, and reactive to light. Conjunctivae and  "EOM are normal. Right eye exhibits no discharge. Left eye exhibits no discharge.   Neck: Normal range of motion. Neck supple. No thyromegaly present.   Cardiovascular: Normal rate and regular rhythm. Exam reveals no gallop.   Pulmonary/Chest: Effort normal and breath sounds normal. She has no wheezes. She has no rales.   Abdominal: Soft. Bowel sounds are normal. She exhibits no distension. There is no tenderness. There is no rebound and no guarding.   Genitourinary:   Genitourinary Comments: declined   Musculoskeletal: Normal range of motion. She exhibits no edema or tenderness.   Lymphadenopathy:     She has no cervical adenopathy.   Neurological: She is alert. No cranial nerve deficit. She exhibits normal muscle tone. Coordination normal.   Skin: Skin is warm and dry. No rash noted. No erythema.   Psychiatric: She has a normal mood and affect. Her behavior is normal. Judgment and thought content normal.       Assessment:       1. Annual physical exam    2. Encounter for counseling for travel    3. Need for vaccination        Plan:     orders cmp lipid tsh cbc ua  Cont meds  Low fat diet  Graded exercise  rtc annually and prn    Health maintenance  Pap with gyn  Breast exam with pap  Lipid ordered  Flu in fall  Tetanus q 10 years       "This note will not be shared with the patient."   "

## 2019-09-06 ENCOUNTER — TELEPHONE (OUTPATIENT)
Dept: FAMILY MEDICINE | Facility: CLINIC | Age: 29
End: 2019-09-06

## 2019-09-06 ENCOUNTER — LAB VISIT (OUTPATIENT)
Dept: LAB | Facility: HOSPITAL | Age: 29
End: 2019-09-06
Attending: FAMILY MEDICINE
Payer: COMMERCIAL

## 2019-09-06 ENCOUNTER — PATIENT MESSAGE (OUTPATIENT)
Dept: FAMILY MEDICINE | Facility: CLINIC | Age: 29
End: 2019-09-06

## 2019-09-06 DIAGNOSIS — Z00.00 ANNUAL PHYSICAL EXAM: ICD-10-CM

## 2019-09-06 DIAGNOSIS — Z71.84 ENCOUNTER FOR COUNSELING FOR TRAVEL: ICD-10-CM

## 2019-09-06 DIAGNOSIS — Z23 NEED FOR VACCINATION: ICD-10-CM

## 2019-09-06 LAB
ALBUMIN SERPL BCP-MCNC: 3.9 G/DL (ref 3.5–5.2)
ALP SERPL-CCNC: 41 U/L (ref 55–135)
ALT SERPL W/O P-5'-P-CCNC: 15 U/L (ref 10–44)
ANION GAP SERPL CALC-SCNC: 8 MMOL/L (ref 8–16)
AST SERPL-CCNC: 17 U/L (ref 10–40)
BASOPHILS # BLD AUTO: 0.03 K/UL (ref 0–0.2)
BASOPHILS NFR BLD: 0.6 % (ref 0–1.9)
BILIRUB SERPL-MCNC: 0.4 MG/DL (ref 0.1–1)
BUN SERPL-MCNC: 7 MG/DL (ref 6–20)
CALCIUM SERPL-MCNC: 9.1 MG/DL (ref 8.7–10.5)
CHLORIDE SERPL-SCNC: 107 MMOL/L (ref 95–110)
CHOLEST SERPL-MCNC: 152 MG/DL (ref 120–199)
CHOLEST/HDLC SERPL: 2.2 {RATIO} (ref 2–5)
CO2 SERPL-SCNC: 25 MMOL/L (ref 23–29)
CREAT SERPL-MCNC: 0.7 MG/DL (ref 0.5–1.4)
DIFFERENTIAL METHOD: ABNORMAL
EOSINOPHIL # BLD AUTO: 0.3 K/UL (ref 0–0.5)
EOSINOPHIL NFR BLD: 5.3 % (ref 0–8)
ERYTHROCYTE [DISTWIDTH] IN BLOOD BY AUTOMATED COUNT: 12 % (ref 11.5–14.5)
EST. GFR  (AFRICAN AMERICAN): >60 ML/MIN/1.73 M^2
EST. GFR  (NON AFRICAN AMERICAN): >60 ML/MIN/1.73 M^2
GLUCOSE SERPL-MCNC: 76 MG/DL (ref 70–110)
HCT VFR BLD AUTO: 43.1 % (ref 37–48.5)
HDLC SERPL-MCNC: 70 MG/DL (ref 40–75)
HDLC SERPL: 46.1 % (ref 20–50)
HGB BLD-MCNC: 13.9 G/DL (ref 12–16)
IMM GRANULOCYTES # BLD AUTO: 0.01 K/UL (ref 0–0.04)
IMM GRANULOCYTES NFR BLD AUTO: 0.2 % (ref 0–0.5)
LDLC SERPL CALC-MCNC: 75.2 MG/DL (ref 63–159)
LYMPHOCYTES # BLD AUTO: 1.7 K/UL (ref 1–4.8)
LYMPHOCYTES NFR BLD: 34.8 % (ref 18–48)
MCH RBC QN AUTO: 31.1 PG (ref 27–31)
MCHC RBC AUTO-ENTMCNC: 32.3 G/DL (ref 32–36)
MCV RBC AUTO: 96 FL (ref 82–98)
MONOCYTES # BLD AUTO: 0.4 K/UL (ref 0.3–1)
MONOCYTES NFR BLD: 7.8 % (ref 4–15)
NEUTROPHILS # BLD AUTO: 2.5 K/UL (ref 1.8–7.7)
NEUTROPHILS NFR BLD: 51.3 % (ref 38–73)
NONHDLC SERPL-MCNC: 82 MG/DL
NRBC BLD-RTO: 0 /100 WBC
PLATELET # BLD AUTO: 195 K/UL (ref 150–350)
PMV BLD AUTO: 11.3 FL (ref 9.2–12.9)
POTASSIUM SERPL-SCNC: 4.3 MMOL/L (ref 3.5–5.1)
PROT SERPL-MCNC: 6.9 G/DL (ref 6–8.4)
RBC # BLD AUTO: 4.47 M/UL (ref 4–5.4)
SODIUM SERPL-SCNC: 140 MMOL/L (ref 136–145)
TRIGL SERPL-MCNC: 34 MG/DL (ref 30–150)
TSH SERPL DL<=0.005 MIU/L-ACNC: 2.29 UIU/ML (ref 0.4–4)
WBC # BLD AUTO: 4.88 K/UL (ref 3.9–12.7)

## 2019-09-06 PROCEDURE — 85025 COMPLETE CBC W/AUTO DIFF WBC: CPT

## 2019-09-06 PROCEDURE — 84443 ASSAY THYROID STIM HORMONE: CPT

## 2019-09-06 PROCEDURE — 36415 COLL VENOUS BLD VENIPUNCTURE: CPT

## 2019-09-06 PROCEDURE — 80061 LIPID PANEL: CPT

## 2019-09-06 PROCEDURE — 80053 COMPREHEN METABOLIC PANEL: CPT

## 2019-09-06 NOTE — TELEPHONE ENCOUNTER
Patient states that you were supposed to send over a prescription to her pharmacy when she was here on Tuesday.  Patient told the call center that it was for an oral vaccine.  Please advise. thanks

## 2019-09-06 NOTE — TELEPHONE ENCOUNTER
----- Message from Berenice Michelle sent at 9/6/2019  9:46 AM CDT -----  Contact: pt  Name of Who is Calling: pt    What is the request in detail: pt states she was suppose to have a vaccine Rx sent over to [facility] and she is there now and they donot have record of it being sent. Please contact to further discuss and advise      Can the clinic reply by [facility]: no    What Number to Call Back if not in [place]: [phone]

## 2019-09-08 DIAGNOSIS — Z30.9 ENCOUNTER FOR CONTRACEPTIVE MANAGEMENT, UNSPECIFIED TYPE: ICD-10-CM

## 2019-09-08 RX ORDER — NORETHINDRONE AND ETHINYL ESTRADIOL AND FERROUS FUMARATE 0.4-35(21)
KIT ORAL
Qty: 1 EACH | Refills: 4 | Status: SHIPPED | OUTPATIENT
Start: 2019-09-08 | End: 2020-01-24

## 2019-09-09 ENCOUNTER — PATIENT MESSAGE (OUTPATIENT)
Dept: FAMILY MEDICINE | Facility: CLINIC | Age: 29
End: 2019-09-09

## 2019-09-10 ENCOUNTER — TELEPHONE (OUTPATIENT)
Dept: FAMILY MEDICINE | Facility: CLINIC | Age: 29
End: 2019-09-10

## 2019-09-10 NOTE — TELEPHONE ENCOUNTER
Left voice message for patient to schedule appointment from [facility].  Torin TENORIO  [phone]

## 2019-10-28 ENCOUNTER — OFFICE VISIT (OUTPATIENT)
Dept: GYNECOLOGIC ONCOLOGY | Facility: CLINIC | Age: 29
End: 2019-10-28
Payer: COMMERCIAL

## 2019-10-28 VITALS
DIASTOLIC BLOOD PRESSURE: 66 MMHG | HEIGHT: 62 IN | BODY MASS INDEX: 21.62 KG/M2 | SYSTOLIC BLOOD PRESSURE: 119 MMHG | WEIGHT: 117.5 LBS | HEART RATE: 57 BPM

## 2019-10-28 DIAGNOSIS — Z15.09 BRCA GENE POSITIVE: Primary | ICD-10-CM

## 2019-10-28 DIAGNOSIS — Z15.01 BRCA GENE POSITIVE: Primary | ICD-10-CM

## 2019-10-28 PROCEDURE — 99999 PR PBB SHADOW E&M-EST. PATIENT-LVL III: ICD-10-PCS | Mod: PBBFAC,,, | Performed by: OBSTETRICS & GYNECOLOGY

## 2019-10-28 PROCEDURE — 99213 PR OFFICE/OUTPT VISIT, EST, LEVL III, 20-29 MIN: ICD-10-PCS | Mod: S$GLB,,, | Performed by: OBSTETRICS & GYNECOLOGY

## 2019-10-28 PROCEDURE — 3008F BODY MASS INDEX DOCD: CPT | Mod: CPTII,S$GLB,, | Performed by: OBSTETRICS & GYNECOLOGY

## 2019-10-28 PROCEDURE — 3008F PR BODY MASS INDEX (BMI) DOCUMENTED: ICD-10-PCS | Mod: CPTII,S$GLB,, | Performed by: OBSTETRICS & GYNECOLOGY

## 2019-10-28 PROCEDURE — 99999 PR PBB SHADOW E&M-EST. PATIENT-LVL III: CPT | Mod: PBBFAC,,, | Performed by: OBSTETRICS & GYNECOLOGY

## 2019-10-28 PROCEDURE — 99213 OFFICE O/P EST LOW 20 MIN: CPT | Mod: S$GLB,,, | Performed by: OBSTETRICS & GYNECOLOGY

## 2019-10-28 NOTE — PROGRESS NOTES
Subjective:      Patient ID: Roxana Perez is a 29 y.o. female.    Chief Complaint: BRCA positive and Follow-up (6 month)      HPI  Patient diagnosed with BRCA2 gene mutation 5/13 in [place]. Moved to [facility], her  is in graduate school for music at [employer] and she is in PA school, both doing well. Has been extensively counseled and had breast risk reducing surgery. Sister at age 28 had the procedure performed already.     Had left ovarian cyst on u/s in feb with normal =85. , f/u u/s 4/15 showed comparison is made with February 16, 2015. The uterus measures 6.5 2.3 x 4.5 cm and is unchanged.. The endometrium measures 5 mm which is normal size. Within the uterus there are no mass lesions. . The right ovary measures4.1 x 3.1 by 4.4 cm. there is now a new complex mass measuring 4.5 x 2.8 by 4. 3 cm which is new since previous examination probably represents a hemorrhagic cyst. The present described left large mass lesion has resolved and there is a smaller complex 1.9 x 1.6 x 1.8 cm lesion remaining. Since this has decreased so significantly is is most likely to be benign. The left ovary measures 1.0 by 2.4 x 2.8 cm. . Arterial and venous flow are noted bilaterally. There is no evidence of ascites.      On OCPs.      Pap 5/14/15 normal.   Pap 2/2018 normal.      2/23/17 follow up:    normal 12  Pelvic US The uterus measures 7.2 cm in length and 2.5 x 5 point cm in transverse dimensions.  The endometrium is normal thickness at 0.2 cm.   No discrete uterine fibroids identified.  The ovaries are normal in size and appearance.  The right ovary measures 3.3 x 1.3 x 1.7 cm.  The left ovary measures 3.7 x 1.9 x 3.1 cm. LEFT ovarian cyst measuring 3.6 x 1.7 x 2.8 cm, unilocular identified, likely physiologic.     S/p bilateral mastectomy  8/2018 follow up:   normal 14   Pelvic US  Impression       No sonographic abnormality.      3/3019 follow up:   normal 11  Pelvic US unremarkable ovaries.    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 Continues on OCPs.    Presents today for follow up. Doing well.      Review of Systems   Constitutional: Negative for appetite change, chills, fatigue and fever.   HENT: Negative for mouth sores.    Respiratory: Negative for cough and shortness of breath.    Cardiovascular: Negative for leg swelling.   Gastrointestinal: Negative for abdominal pain, blood in stool, constipation and diarrhea.   Endocrine: Negative for cold intolerance.   Genitourinary: Negative for dysuria and vaginal bleeding.   Musculoskeletal: Negative for myalgias.   Skin: Negative for rash.   Allergic/Immunologic: Negative.    Neurological: Negative for weakness and numbness.   Hematological: Negative for adenopathy. Does not bruise/bleed easily.   Psychiatric/Behavioral: Negative for confusion.       Objective:   Physical Exam:   Constitutional: She is oriented to person, place, and time. She appears well-developed and well-nourished.    HENT:   Head: Normocephalic and atraumatic.    Eyes: Pupils are equal, round, and reactive to light. EOM are normal.    Neck: Normal range of motion. Neck supple. No thyromegaly present.    Cardiovascular: Normal rate, regular rhythm and intact distal pulses.     Pulmonary/Chest: Effort normal and breath sounds normal. No respiratory distress. She has no wheezes.        Abdominal: Soft. Bowel sounds are normal. She exhibits no distension, no ascites and no mass. There is no tenderness.     Genitourinary: Rectum normal, vagina normal and uterus normal. Pelvic exam was performed with patient supine. There is no lesion on the right labia. There is no lesion on the left labia. Cervix is normal. Right adnexum displays no mass. Left adnexum displays no mass.           Musculoskeletal: Normal range of motion and moves all extremeties.      Lymphadenopathy:     She has no cervical adenopathy.        Right: No inguinal and no supraclavicular adenopathy present.         Left: No inguinal and no supraclavicular adenopathy present.    Neurological: She is alert and oriented to person, place, and time.    Skin: Skin is warm and dry. No rash noted.    Psychiatric: She has a normal mood and affect.       Assessment:     1. BRCA gene positive        Plan:   No orders of the defined types were placed in this encounter.    Exam normal today.   Feels well.  Will planned for continued follow up in 6 months.

## 2020-01-24 DIAGNOSIS — Z30.9 ENCOUNTER FOR CONTRACEPTIVE MANAGEMENT, UNSPECIFIED TYPE: ICD-10-CM

## 2020-01-24 RX ORDER — NORETHINDRONE AND ETHINYL ESTRADIOL AND FERROUS FUMARATE 0.4-35(21)
KIT ORAL
Qty: 1 EACH | Refills: 1 | Status: SHIPPED | OUTPATIENT
Start: 2020-01-24 | End: 2020-03-02 | Stop reason: SDUPTHER

## 2020-03-02 DIAGNOSIS — Z30.9 ENCOUNTER FOR CONTRACEPTIVE MANAGEMENT, UNSPECIFIED TYPE: ICD-10-CM

## 2020-03-02 RX ORDER — NORETHINDRONE AND ETHINYL ESTRADIOL AND FERROUS FUMARATE 0.4-35(21)
1 KIT ORAL DAILY
Qty: 1 EACH | Refills: 3 | Status: SHIPPED | OUTPATIENT
Start: 2020-03-02 | End: 2020-05-24 | Stop reason: SDUPTHER

## 2020-03-23 ENCOUNTER — HOSPITAL ENCOUNTER (OUTPATIENT)
Dept: RADIOLOGY | Facility: HOSPITAL | Age: 30
Discharge: HOME | End: 2020-03-23
Attending: PHYSICIAN ASSISTANT
Payer: COMMERCIAL

## 2020-03-23 ENCOUNTER — PATIENT MESSAGE (OUTPATIENT)
Dept: SURGERY | Facility: CLINIC | Age: 30
End: 2020-03-23

## 2020-03-23 ENCOUNTER — OFFICE VISIT (OUTPATIENT)
Dept: SURGERY | Facility: CLINIC | Age: 30
End: 2020-03-23
Payer: COMMERCIAL

## 2020-03-23 VITALS
WEIGHT: 116.75 LBS | TEMPERATURE: 97 F | SYSTOLIC BLOOD PRESSURE: 104 MMHG | BODY MASS INDEX: 21.49 KG/M2 | DIASTOLIC BLOOD PRESSURE: 69 MMHG | HEART RATE: 54 BPM | HEIGHT: 62 IN

## 2020-03-23 DIAGNOSIS — N63.20 LEFT BREAST LUMP: ICD-10-CM

## 2020-03-23 DIAGNOSIS — N63.20 LEFT BREAST LUMP: Primary | ICD-10-CM

## 2020-03-23 PROCEDURE — 3008F BODY MASS INDEX DOCD: CPT | Mod: CPTII,S$GLB,, | Performed by: PHYSICIAN ASSISTANT

## 2020-03-23 PROCEDURE — 77065 MAMMO DIGITAL DIAGNOSTIC LEFT WITH TOMOSYNTHESIS_CAD: ICD-10-PCS | Mod: 26,LT,, | Performed by: RADIOLOGY

## 2020-03-23 PROCEDURE — 99999 PR PBB SHADOW E&M-EST. PATIENT-LVL III: ICD-10-PCS | Mod: PBBFAC,,, | Performed by: PHYSICIAN ASSISTANT

## 2020-03-23 PROCEDURE — 76642 ULTRASOUND BREAST LIMITED: CPT | Mod: TC,PO,LT

## 2020-03-23 PROCEDURE — 99999 PR PBB SHADOW E&M-EST. PATIENT-LVL III: CPT | Mod: PBBFAC,,, | Performed by: PHYSICIAN ASSISTANT

## 2020-03-23 PROCEDURE — 76642 US BREAST LEFT LIMITED: ICD-10-PCS | Mod: 26,LT,, | Performed by: RADIOLOGY

## 2020-03-23 PROCEDURE — 3008F PR BODY MASS INDEX (BMI) DOCUMENTED: ICD-10-PCS | Mod: CPTII,S$GLB,, | Performed by: PHYSICIAN ASSISTANT

## 2020-03-23 PROCEDURE — 99203 OFFICE O/P NEW LOW 30 MIN: CPT | Mod: S$GLB,,, | Performed by: PHYSICIAN ASSISTANT

## 2020-03-23 PROCEDURE — 76642 ULTRASOUND BREAST LIMITED: CPT | Mod: 26,LT,, | Performed by: RADIOLOGY

## 2020-03-23 PROCEDURE — 77065 DX MAMMO INCL CAD UNI: CPT | Mod: 26,LT,, | Performed by: RADIOLOGY

## 2020-03-23 PROCEDURE — 77061 BREAST TOMOSYNTHESIS UNI: CPT | Mod: TC,PO,LT

## 2020-03-23 PROCEDURE — 77065 DX MAMMO INCL CAD UNI: CPT | Mod: TC,PO,LT

## 2020-03-23 PROCEDURE — 99203 PR OFFICE/OUTPT VISIT, NEW, LEVL III, 30-44 MIN: ICD-10-PCS | Mod: S$GLB,,, | Performed by: PHYSICIAN ASSISTANT

## 2020-03-23 PROCEDURE — G0279 MAMMO DIGITAL DIAGNOSTIC LEFT WITH TOMOSYNTHESIS_CAD: ICD-10-PCS | Mod: 26,,, | Performed by: RADIOLOGY

## 2020-03-23 PROCEDURE — G0279 TOMOSYNTHESIS, MAMMO: HCPCS | Mod: 26,,, | Performed by: RADIOLOGY

## 2020-03-23 NOTE — PROGRESS NOTES
[facility]  [facility] [place]  3/23/2020      SUBJECTIVE:   Ms. Roxana Perez is a 29 y.o. BRCA2 positive female patient who presents today complaining of a new LEFT breast lump.    History of Present Illness: In June of 2013 patient was found to have a BRCA2 positive gene mutation.  In 2015 she had an MRI of the breast which showed no evidence of malignancy; fibroadenoma on the right and lipoma at the left breast.  On 12/7/2015 she had a bilateral prophylactic skin sparing mastectomy with tissue expander to implant reconstruction.  This was performed by Dr. Jean-Baptiste and Dr. Padilla.    Interval History: Patient states she first noticed this lump at her left breast on 3/21 after getting out the shower. She describes it as new and non-painful.  She denies palpating any right breast masses.     Review of Systems: Denies any chest pain or shortness of breath.  Denies any fever or chills.  See HPI/ Interval History for other systems reviewed.    OBJECTIVE:   Vitals:    03/23/20 1113   BP: 104/69   Pulse: (!) 54   Temp: 96.8 °F (36 °C)     Physical Exam:  HEENT: Normocephalic, atraumatic.    General: alert and oriented; no acute distress.  Breast:Well healed vertical scars bilaterally. Implants soft and intact; surgically absent nipple.  ~1 cm mobile, somewhat superficial density at the 4-5 o'clock position of the left breast, N+6 cm.  Non-tender.  No right breast masses.  Mild desquamation and 0.7 cm erythematous lesion at center of right breast (patient states this is from wearing artifical adhesive nipples that she had left on for a long time).    Lymph: No palpable adjacent axillary lymph nodes.      ASSESSMENT:  Ms. Roxana Perez is a 29 y.o. year old female with a BRCA2 positive gene mutation, s/p bilateral prophylactic mastectomy with implant reconstruction on 12/7/15; presents today complaining of a new left breast lump.     PLAN:   We discussed that clinically this new lump at her left  breast appears benign.  However, further imaging with a focused ultrasound was recommended to rule out any abnormalities or malignancy.    We discussed that this could possible be a sebaceous cyst versus lipoma versus part of the implant .    She was sent to radiology today for further imaging.    We also discussed that she should return to clinic annually for regular breast exams.     ~Martha Martínez PA-C      Surgical Oncology            3/23/2020

## 2020-03-26 ENCOUNTER — TELEPHONE (OUTPATIENT)
Dept: SURGERY | Facility: CLINIC | Age: 30
End: 2020-03-26

## 2020-03-26 NOTE — TELEPHONE ENCOUNTER
Spoke to Patient to remind her of the 1300 of their appointment in the General Surgery Department with Dr. Stack.  Directions given.  Instructed to check in 5-10 minutes early. Explained that there are no dietary restrictions.  Stated that she works at [facility] and is aware of the temperature checks.  Patient verbalized understanding of instructions.

## 2020-03-27 ENCOUNTER — PROCEDURE VISIT (OUTPATIENT)
Dept: SURGERY | Facility: CLINIC | Age: 30
End: 2020-03-27
Payer: COMMERCIAL

## 2020-03-27 VITALS
HEIGHT: 62 IN | SYSTOLIC BLOOD PRESSURE: 105 MMHG | HEART RATE: 67 BPM | BODY MASS INDEX: 21.51 KG/M2 | WEIGHT: 116.88 LBS | DIASTOLIC BLOOD PRESSURE: 69 MMHG

## 2020-03-27 DIAGNOSIS — N63.20 LEFT BREAST MASS: Primary | ICD-10-CM

## 2020-03-27 PROCEDURE — 88305 TISSUE EXAM BY PATHOLOGIST: CPT | Mod: 26,,, | Performed by: PATHOLOGY

## 2020-03-27 PROCEDURE — 88305 TISSUE EXAM BY PATHOLOGIST: ICD-10-PCS | Mod: 26,,, | Performed by: PATHOLOGY

## 2020-03-27 PROCEDURE — 88305 TISSUE EXAM BY PATHOLOGIST: CPT | Performed by: PATHOLOGY

## 2020-03-27 PROCEDURE — 21550 BIOPSY OF NECK/CHEST: CPT | Mod: S$GLB,,, | Performed by: SURGERY

## 2020-03-27 PROCEDURE — 21550 PR BIOPSY SOFT TISSUE NECK/CHEST: ICD-10-PCS | Mod: S$GLB,,, | Performed by: SURGERY

## 2020-03-27 NOTE — PROCEDURES
Preoperative diagnosis:  Palpable left breast/chest wall nodule with history of BRCA 2 positive genetic mutation status post previous prophylactic skin sparing mastectomies approximately 5 years ago    Postoperative diagnosis:  Same    Procedure:  Excisional biopsy of left breast mass/chest wall mass    Primary surgeon:  Hira Jean-Baptiste MD    Anesthesia was straight local anesthesia with 1% plain lidocaine    Date of procedure was Friday 03/27/2020    Complications were none, drains were none, specimens were left breast mass/chest wall mass excisional biopsy    Findings:  Palpable density in the subcutaneous tissue with ultrasound images reviewed; intraoperatively consistent with fibrosis of the capsule around the existing implant.    Procedure in detail:  The patient was seen in the preop holding area and consent was obtained.  The palpable density in the subcutaneous tissue was confirmed with the patient and marked.  She was brought to the minor procedure room.  She was placed in a supine position.  The left reconstructed breast/chest wall was prepped and draped in a sterile fashion.  The 2 cm incision was marked over the palpable density.  Local anesthesia was infiltrated intradermally.  The skin was incised sharply and full-thickness skin was transected sharply into the very thin if any at all layer of subcutaneous tissue.  We then palpated the capsule around the existing implant.  The fibrous density was within the capsule.  This was excised with sharp dissection paying attention to not injure or puncture the underlying implant.  The fibers density of the capsule was excised and submitted to pathology for permanent sectioning this may have been either fibrosis of the capsule itself verses fibrosis of possible AlloDerm from the original reconstruction.  Once the fibrous nodular density was removed the capsule was reapproximated with running 3 0 Vicryl suture paying attention to not injure the underlying  implant.  The skin was then closed with a running 4 Monocryl subcuticular skin closure.  Sterile skin Dermabond was applied.  She tolerated the procedure well without complication.  Estimated blood loss was minimal.  All needle instrument sponge count were correct.  The specimen was sent to pathology for permanent sectioning.    Hira Jean-Baptiste MD  3-

## 2020-04-01 LAB
FINAL PATHOLOGIC DIAGNOSIS: NORMAL
GROSS: NORMAL

## 2020-04-21 DIAGNOSIS — Z01.84 ANTIBODY RESPONSE EXAMINATION: ICD-10-CM

## 2020-04-24 ENCOUNTER — LAB VISIT (OUTPATIENT)
Dept: LAB | Facility: HOSPITAL | Age: 30
End: 2020-04-24
Attending: INTERNAL MEDICINE
Payer: COMMERCIAL

## 2020-04-24 DIAGNOSIS — Z01.84 ANTIBODY RESPONSE EXAMINATION: ICD-10-CM

## 2020-04-24 LAB — SARS-COV-2 IGG SERPL QL IA: NEGATIVE

## 2020-04-24 PROCEDURE — 36415 COLL VENOUS BLD VENIPUNCTURE: CPT

## 2020-04-24 PROCEDURE — 86769 SARS-COV-2 COVID-19 ANTIBODY: CPT

## 2020-06-03 ENCOUNTER — TELEPHONE (OUTPATIENT)
Dept: GYNECOLOGIC ONCOLOGY | Facility: CLINIC | Age: 30
End: 2020-06-03

## 2020-06-03 DIAGNOSIS — Z30.9 ENCOUNTER FOR CONTRACEPTIVE MANAGEMENT, UNSPECIFIED TYPE: ICD-10-CM

## 2020-06-03 RX ORDER — NORETHINDRONE AND ETHINYL ESTRADIOL AND FERROUS FUMARATE 0.4-35(21)
1 KIT ORAL DAILY
Qty: 1 EACH | Refills: 3 | OUTPATIENT
Start: 2020-06-03

## 2020-06-03 NOTE — TELEPHONE ENCOUNTER
Spoke with pt. Informed her a refill request was sent to her pharmacy on 5/25 for the birth control and that is 3 months worth. Pt asking about trans vaginal ultrasound and getting it before her f/u with Kiel. Informed pt I would send this message to Dr Dyson for review. Verbalized understanding and denies any other needs.

## 2020-07-16 ENCOUNTER — LAB VISIT (OUTPATIENT)
Dept: LAB | Facility: HOSPITAL | Age: 30
End: 2020-07-16
Attending: OBSTETRICS & GYNECOLOGY
Payer: COMMERCIAL

## 2020-07-16 ENCOUNTER — OFFICE VISIT (OUTPATIENT)
Dept: GYNECOLOGIC ONCOLOGY | Facility: CLINIC | Age: 30
End: 2020-07-16
Payer: COMMERCIAL

## 2020-07-16 VITALS
DIASTOLIC BLOOD PRESSURE: 62 MMHG | HEART RATE: 63 BPM | HEIGHT: 62 IN | WEIGHT: 115.94 LBS | SYSTOLIC BLOOD PRESSURE: 110 MMHG | BODY MASS INDEX: 21.34 KG/M2

## 2020-07-16 DIAGNOSIS — Z15.01 BRCA POSITIVE: Primary | ICD-10-CM

## 2020-07-16 DIAGNOSIS — Z15.01 BRCA POSITIVE: ICD-10-CM

## 2020-07-16 DIAGNOSIS — Z15.09 BRCA POSITIVE: ICD-10-CM

## 2020-07-16 DIAGNOSIS — Z15.09 BRCA POSITIVE: Primary | ICD-10-CM

## 2020-07-16 LAB — CANCER AG125 SERPL-ACNC: 10 U/ML (ref 0–30)

## 2020-07-16 PROCEDURE — 3008F PR BODY MASS INDEX (BMI) DOCUMENTED: ICD-10-PCS | Mod: CPTII,S$GLB,, | Performed by: OBSTETRICS & GYNECOLOGY

## 2020-07-16 PROCEDURE — 99999 PR PBB SHADOW E&M-EST. PATIENT-LVL III: CPT | Mod: PBBFAC,,, | Performed by: OBSTETRICS & GYNECOLOGY

## 2020-07-16 PROCEDURE — 86304 IMMUNOASSAY TUMOR CA 125: CPT

## 2020-07-16 PROCEDURE — 99214 PR OFFICE/OUTPT VISIT, EST, LEVL IV, 30-39 MIN: ICD-10-PCS | Mod: S$GLB,,, | Performed by: OBSTETRICS & GYNECOLOGY

## 2020-07-16 PROCEDURE — 99214 OFFICE O/P EST MOD 30 MIN: CPT | Mod: S$GLB,,, | Performed by: OBSTETRICS & GYNECOLOGY

## 2020-07-16 PROCEDURE — 36415 COLL VENOUS BLD VENIPUNCTURE: CPT

## 2020-07-16 PROCEDURE — 99999 PR PBB SHADOW E&M-EST. PATIENT-LVL III: ICD-10-PCS | Mod: PBBFAC,,, | Performed by: OBSTETRICS & GYNECOLOGY

## 2020-07-16 PROCEDURE — 3008F BODY MASS INDEX DOCD: CPT | Mod: CPTII,S$GLB,, | Performed by: OBSTETRICS & GYNECOLOGY

## 2020-07-16 NOTE — PROGRESS NOTES
Subjective:      Patient ID: Roxana Perez is a 30 y.o. female.    Chief Complaint: BRCA gene positive      HPI  Patient diagnosed with BRCA2 gene mutation 5/13 in [place]. Moved to [facility], her  is in graduate school for music at [employer] and she is in PA school, both doing well. Has been extensively counseled and had breast risk reducing surgery. Sister at age 28 had the procedure performed already.     Had left ovarian cyst on u/s in feb with normal =80. , f/u u/s 4/15 showed comparison is made with February 16, 2015. The uterus measures 6.5 2.3 x 4.5 cm and is unchanged.. The endometrium measures 5 mm which is normal size. Within the uterus there are no mass lesions. . The right ovary measures4.1 x 3.1 by 4.4 cm. there is now a new complex mass measuring 4.5 x 2.8 by 4. 3 cm which is new since previous examination probably represents a hemorrhagic cyst. The present described left large mass lesion has resolved and there is a smaller complex 1.9 x 1.6 x 1.8 cm lesion remaining. Since this has decreased so significantly is is most likely to be benign. The left ovary measures 1.0 by 2.4 x 2.8 cm. . Arterial and venous flow are noted bilaterally. There is no evidence of ascites.      On OCPs.      Pap 5/14/15 normal.   Pap 2/2018 normal.      2/23/17 follow up:    normal 12  Pelvic US The uterus measures 7.2 cm in length and 2.5 x 5 point cm in transverse dimensions.  The endometrium is normal thickness at 0.2 cm.   No discrete uterine fibroids identified.  The ovaries are normal in size and appearance.  The right ovary measures 3.3 x 1.3 x 1.7 cm.  The left ovary measures 3.7 x 1.9 x 3.1 cm. LEFT ovarian cyst measuring 3.6 x 1.7 x 2.8 cm, unilocular identified, likely physiologic.     S/p bilateral mastectomy  8/2018 follow up:   normal 14   Pelvic US  Impression       No sonographic abnormality.      3/3019 follow up:   normal 11  Pelvic US unremarkable ovaries.   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 Continues on OCPs.    7/16/2020 follow up:   normal 10  Pelvic US pending  Doing well. Continues on OCPs. No gyn concerns today.       Review of Systems   Constitutional: Negative for appetite change, chills, fatigue and fever.   HENT: Negative for mouth sores.    Respiratory: Negative for cough and shortness of breath.    Cardiovascular: Negative for leg swelling.   Gastrointestinal: Negative for abdominal pain, blood in stool, constipation and diarrhea.   Endocrine: Negative for cold intolerance.   Genitourinary: Negative for dysuria and vaginal bleeding.   Musculoskeletal: Negative for myalgias.   Skin: Negative for rash.   Allergic/Immunologic: Negative.    Neurological: Negative for weakness and numbness.   Hematological: Negative for adenopathy. Does not bruise/bleed easily.   Psychiatric/Behavioral: Negative for confusion.       Objective:   Physical Exam:   Constitutional: She is oriented to person, place, and time. She appears well-developed and well-nourished.    HENT:   Head: Normocephalic and atraumatic.    Eyes: Pupils are equal, round, and reactive to light. EOM are normal.    Neck: Normal range of motion. Neck supple. No thyromegaly present.    Cardiovascular: Normal rate, regular rhythm and intact distal pulses.     Pulmonary/Chest: Effort normal and breath sounds normal. No respiratory distress. She has no wheezes.        Abdominal: Soft. Bowel sounds are normal. She exhibits no distension and no mass. There is no abdominal tenderness.             Musculoskeletal: Normal range of motion and moves all extremeties.      Lymphadenopathy:     She has no cervical adenopathy.        Right: No supraclavicular adenopathy present.        Left: No supraclavicular adenopathy present.    Neurological: She is alert and oriented to person, place, and time.    Skin: Skin is warm and dry. No rash noted.    Psychiatric: She has a normal mood and affect.       Assessment:      1. BRCA positive        Plan:     Orders Placed This Encounter   Procedures    US Transvaginal Non OB         Exam normal today.   Feels well, no new concerns.   normal. Pelvic US pending.   Will planned for continued follow up in 6 months or sooner if needed.

## 2020-07-20 ENCOUNTER — NURSE TRIAGE (OUTPATIENT)
Dept: ADMINISTRATIVE | Facility: CLINIC | Age: 30
End: 2020-07-20

## 2020-07-20 ENCOUNTER — OFFICE VISIT (OUTPATIENT)
Dept: URGENT CARE | Facility: CLINIC | Age: 30
End: 2020-07-20
Payer: COMMERCIAL

## 2020-07-20 VITALS
WEIGHT: 115 LBS | OXYGEN SATURATION: 99 % | TEMPERATURE: 99 F | HEART RATE: 72 BPM | HEIGHT: 62 IN | BODY MASS INDEX: 21.16 KG/M2

## 2020-07-20 DIAGNOSIS — Z20.822 CLOSE EXPOSURE TO COVID-19 VIRUS: Primary | ICD-10-CM

## 2020-07-20 DIAGNOSIS — Z03.818 ENCOUNTER FOR OBSERVATION FOR SUSPECTED EXPOSURE TO OTHER BIOLOGICAL AGENTS RULED OUT: ICD-10-CM

## 2020-07-20 PROCEDURE — 99214 OFFICE O/P EST MOD 30 MIN: CPT | Mod: S$GLB,,, | Performed by: NURSE PRACTITIONER

## 2020-07-20 PROCEDURE — U0003 INFECTIOUS AGENT DETECTION BY NUCLEIC ACID (DNA OR RNA); SEVERE ACUTE RESPIRATORY SYNDROME CORONAVIRUS 2 (SARS-COV-2) (CORONAVIRUS DISEASE [COVID-19]), AMPLIFIED PROBE TECHNIQUE, MAKING USE OF HIGH THROUGHPUT TECHNOLOGIES AS DESCRIBED BY CMS-2020-01-R: HCPCS

## 2020-07-20 PROCEDURE — 99214 PR OFFICE/OUTPT VISIT, EST, LEVL IV, 30-39 MIN: ICD-10-PCS | Mod: S$GLB,,, | Performed by: NURSE PRACTITIONER

## 2020-07-20 NOTE — PROGRESS NOTES
"Subjective:       Patient ID: Roxana Perez is a 29 y.o. female.    Vitals:  height is 5' 2" (1.575 m) and weight is 52.2 kg (115 lb). Her temperature is 99 °F (37.2 °C). Her pulse is 72. Her oxygen saturation is 99%.     Chief Complaint: Fatigue    29 yr old female presents to the Urgent Care for complaint of headache and fatigue x 4 days. Patient describes temporal headache as dull. 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 Patient planning a trip out of town for birthday and wanted to r/o COVID before being around family. Patient denies any fever, SOB, cough or diarrhea.   Headache   This is a new problem. The current episode started in the past 7 days. The problem occurs constantly. The problem has been unchanged. The pain is located in the temporal region. The pain does not radiate. The pain quality is not similar to prior headaches. The quality of the pain is described as aching. The pain is at a severity of 5/10. The pain is mild. Pertinent negatives include no blurred vision, coughing, dizziness, fever, nausea, sore throat, vomiting or weakness. Nothing aggravates the symptoms. She has tried nothing for the symptoms. There is no history of hypertension, migraine headaches or migraines in the family.       Constitution: Negative for chills, fatigue and fever.   HENT: Negative for congestion and sore throat.    Neck: Negative for painful lymph nodes.   Cardiovascular: Negative for chest pain and leg swelling.   Eyes: Negative for double vision and blurred vision.   Respiratory: Negative for cough and shortness of breath.    Gastrointestinal: Negative for nausea, vomiting and diarrhea.   Genitourinary: Negative for dysuria, frequency, urgency and history of kidney stones.   Musculoskeletal: Negative for joint pain, joint swelling, muscle cramps and muscle ache.   Skin: Negative for color change, pale, rash and bruising.   Allergic/Immunologic: Negative for seasonal allergies. "   Neurological: Negative for dizziness, history of vertigo, light-headedness, passing out, headaches and history of migraines.   Hematologic/Lymphatic: Negative for swollen lymph nodes.   Psychiatric/Behavioral: Negative for nervous/anxious, sleep disturbance and depression. The patient is not nervous/anxious.        Objective:      Physical Exam   Patient was seen remotely due to State of Emergency for the COVID-19 outbreak.  Counseled patient and answered questions in regards to COVID-19 testing and diagnosis. Patient agreed to remote visit. Patient spoke clear and without difficulty via remote visit. Patient denies any symptoms at this time. ER precautions discussed. Patient verbalized understanding.         Assessment:       1. Close Exposure to Covid-19 Virus        Plan:         Close Exposure to Covid-19 Virus  -     COVID-19 Routine Screening      Patient Instructions   Someone will call you from [facility] within 3-5 days to discuss lab results.       Instructions for Patients with Confirmed or Suspected COVID-19    If you are awaiting your test result, you will either be called or it will be released to the patient portal.  If you have any questions about your test, please visit [email or web address] or call our COVID-19 information line at [phone].       Stay home and stay away from family members and friends. The CDC says, you can leave home after these three things have happened: 1) You have had no fever for at least 72 hours (that is three full days of no fever without the use of medicine that reduces fevers) 2) AND other symptoms have improved (for example, when your cough or shortness of breath have improved) 3) AND at least 10 days have passed since your symptoms first appeared.   Separate yourself from other people and animals in your home.   Call ahead before visiting your doctor.   Wear a facemask.   Cover your coughs and sneezes.   Wash your hands often with soap and water; hand   can be used, too.   Avoid sharing personal household items.   Wipe down surfaces used daily.   Monitor your symptoms. Seek prompt medical attention if your illness is worsening (e.g., difficulty breathing).    Before seeking care, call your healthcare provider.   If you have a medical emergency and need to call 911, notify the dispatch personnel that you have, or are being evaluated for COVID-19. If possible, put on a facemask before emergency medical services arrive.        Recommended precautions for household members, intimate partners, and caregivers in a home setting of a patient with symptomatic laboratory-confirmed COVID-19 or a patient under investigation.  Household members, intimate partners, and caregivers in the home setting awaiting tests results have close contact with a person with symptomatic, laboratory-confirmed COVID-19 or a person under investigation. Close contacts should monitor their health; they should call their provider right away if they develop symptoms suggestive of COVID-19 (e.g., fever, cough, shortness of breath).    Close contacts should also follow these recommendations:   Make sure that you understand and can help the patient follow their provider's instructions for medication(s) and care. You should help the patient with basic needs in the home and provide support for getting groceries, prescriptions, and other personal needs.   Monitor the patient's symptoms. If the patient is getting sicker, call his or her healthcare provider and tell them that the patient has laboratory-confirmed COVID-19. If the patient has a medical emergency and you need to call 911, notify the dispatch personnel that the patient has, or is being evaluated for COVID-19.   Household members should stay in another room or be  from the patient. Household members should use a separate bedroom and bathroom, if available.   Prohibit visitors.   Household members should care for any  pets in the home.   Make sure that shared spaces in the home have good air flow, such as by an air conditioner or an opened window, weather permitting.   Perform hand hygiene frequently. Wash your hands often with soap and water for at least 20 seconds or use an alcohol-based hand  (that contains > 60% alcohol) covering all surfaces of your hands and rubbing them together until they feel dry. Soap and water should be used preferentially.   Avoid touching your eyes, nose, and mouth.   The patient should wear a facemask. If the patient is not able to wear a facemask (for example, because it causes trouble breathing), caregivers should wear a mask when they are in the same room as the patient.   Wear a disposable facemask and gloves when you touch or have contact with the patient's blood, stool, or body fluids, such as saliva, sputum, nasal mucus, vomit, urine.  o Throw out disposable facemasks and gloves after using them. Do not reuse.  o When removing personal protective equipment, first remove and dispose of gloves. Then, immediately clean your hands with soap and water or alcohol-based hand . Next, remove and dispose of facemask, and immediately clean your hands again with soap and water or alcohol-based hand .   You should not share dishes, drinking glasses, cups, eating utensils, towels, bedding, or other items with the patient. After the patient uses these items, you should wash them thoroughly (see below Wash laundry thoroughly).   Clean all high-touch surfaces, such as counters, tabletops, doorknobs, bathroom fixtures, toilets, phones, keyboards, tablets, and bedside tables, every day. Also, clean any surfaces that may have blood, stool, or body fluids on them.   Use a household cleaning spray or wipe, according to the label instructions. Labels contain instructions for safe and effective use of the cleaning product including precautions you should take when applying the  product, such as wearing gloves and making sure you have good ventilation during use of the product.   Wash laundry thoroughly.  o Immediately remove and wash clothes or bedding that have blood, stool, or body fluids on them.  o Wear disposable gloves while handling soiled items and keep soiled items away from your body. Clean your hands (with soap and water or an alcohol-based hand ) immediately after removing your gloves.  o Read and follow directions on labels of laundry or clothing items and detergent. In general, using a normal laundry detergent according to washing machine instructions and dry thoroughly using the warmest temperatures recommended on the clothing label.   Place all used disposable gloves, facemasks, and other contaminated items in a lined container before disposing of them with other household waste. Clean your hands (with soap and water or an alcohol-based hand ) immediately after handling these items. Soap and water should be used preferentially if hands are visibly dirty.   Discuss any additional questions with your state or local health department or healthcare provider. Check available hours when contacting your local health department.    For more information see CDC link below.      https://www.cdc.gov/coronavirus/2019-ncov/hcp/guidance-prevent-spread.html#precautions        Sources:  [facility] and [facility]    If you were prescribed a narcotic or controlled medication, do not drive or operate heavy equipment or machinery while taking these medications.  You must understand that you've received an Urgent Care treatment only and that you may be released before all your medical problems are known or treated. You, the patient, will arrange for follow up care as instructed.  Follow up with your PCP or specialty clinic as directed within 2-5 days if not improved or as needed.  You can call [phone] to schedule an appointment with the  appropriate provider.  If your condition worsens we recommend that you receive another evaluation at the emergency room immediately or contact your primary medical clinics after hours call service to discuss your concerns.  Please return here or go to the Emergency Department for any concerns or worsening of condition.

## 2020-07-20 NOTE — TELEPHONE ENCOUNTER
Reason for Disposition   Question about upcoming scheduled test, no triage required and triager able to answer question    Protocols used: INFORMATION ONLY CALL-A-    Pt stated her PCP placed an order for her to test for COVID. Stated she spoke with employee health and was told to go to Urgent Care. Advised that is correct or call [phone]. Pt verbalized understanding.

## 2020-07-21 NOTE — PATIENT INSTRUCTIONS
Someone will call you from [facility] within 3-5 days to discuss lab results.       Instructions for Patients with Confirmed or Suspected COVID-19    If you are awaiting your test result, you will either be called or it will be released to the patient portal.  If you have any questions about your test, please visit [email or web address] or call our COVID-19 information line at [phone].       Stay home and stay away from family members and friends. The CDC says, you can leave home after these three things have happened: 1) You have had no fever for at least 72 hours (that is three full days of no fever without the use of medicine that reduces fevers) 2) AND other symptoms have improved (for example, when your cough or shortness of breath have improved) 3) AND at least 10 days have passed since your symptoms first appeared.   Separate yourself from other people and animals in your home.   Call ahead before visiting your doctor.   Wear a facemask.   Cover your coughs and sneezes.   Wash your hands often with soap and water; hand  can be used, too.   Avoid sharing personal household items.   Wipe down surfaces used daily.   Monitor your symptoms. Seek prompt medical attention if your illness is worsening (e.g., difficulty breathing).    Before seeking care, call your healthcare provider.   If you have a medical emergency and need to call 911, notify the dispatch personnel that you have, or are being evaluated for COVID-19. If possible, put on a facemask before emergency medical services arrive.        Recommended precautions for household members, intimate partners, and caregivers in a home setting of a patient with symptomatic laboratory-confirmed COVID-19 or a patient under investigation.  Household members, intimate partners, and caregivers in the home setting awaiting tests results have close contact with a person with symptomatic, laboratory-confirmed COVID-19 or a person under  investigation. Close contacts should monitor their health; they should call their provider right away if they develop symptoms suggestive of COVID-19 (e.g., fever, cough, shortness of breath).    Close contacts should also follow these recommendations:   Make sure that you understand and can help the patient follow their provider's instructions for medication(s) and care. You should help the patient with basic needs in the home and provide support for getting groceries, prescriptions, and other personal needs.   Monitor the patient's symptoms. If the patient is getting sicker, call his or her healthcare provider and tell them that the patient has laboratory-confirmed COVID-19. If the patient has a medical emergency and you need to call 911, notify the dispatch personnel that the patient has, or is being evaluated for COVID-19.   Household members should stay in another room or be  from the patient. Household members should use a separate bedroom and bathroom, if available.   Prohibit visitors.   Household members should care for any pets in the home.   Make sure that shared spaces in the home have good air flow, such as by an air conditioner or an opened window, weather permitting.   Perform hand hygiene frequently. Wash your hands often with soap and water for at least 20 seconds or use an alcohol-based hand  (that contains > 60% alcohol) covering all surfaces of your hands and rubbing them together until they feel dry. Soap and water should be used preferentially.   Avoid touching your eyes, nose, and mouth.   The patient should wear a facemask. If the patient is not able to wear a facemask (for example, because it causes trouble breathing), caregivers should wear a mask when they are in the same room as the patient.   Wear a disposable facemask and gloves when you touch or have contact with the patient's blood, stool, or body fluids, such as saliva, sputum, nasal mucus, vomit,  urine.  o Throw out disposable facemasks and gloves after using them. Do not reuse.  o When removing personal protective equipment, first remove and dispose of gloves. Then, immediately clean your hands with soap and water or alcohol-based hand . Next, remove and dispose of facemask, and immediately clean your hands again with soap and water or alcohol-based hand .   You should not share dishes, drinking glasses, cups, eating utensils, towels, bedding, or other items with the patient. After the patient uses these items, you should wash them thoroughly (see below Wash laundry thoroughly).   Clean all high-touch surfaces, such as counters, tabletops, doorknobs, bathroom fixtures, toilets, phones, keyboards, tablets, and bedside tables, every day. Also, clean any surfaces that may have blood, stool, or body fluids on them.   Use a household cleaning spray or wipe, according to the label instructions. Labels contain instructions for safe and effective use of the cleaning product including precautions you should take when applying the product, such as wearing gloves and making sure you have good ventilation during use of the product.   Wash laundry thoroughly.  o Immediately remove and wash clothes or bedding that have blood, stool, or body fluids on them.  o Wear disposable gloves while handling soiled items and keep soiled items away from your body. Clean your hands (with soap and water or an alcohol-based hand ) immediately after removing your gloves.  o Read and follow directions on labels of laundry or clothing items and detergent. In general, using a normal laundry detergent according to washing machine instructions and dry thoroughly using the warmest temperatures recommended on the clothing label.   Place all used disposable gloves, facemasks, and other contaminated items in a lined container before disposing of them with other household waste. Clean your hands (with soap and  water or an alcohol-based hand ) immediately after handling these items. Soap and water should be used preferentially if hands are visibly dirty.   Discuss any additional questions with your state or local health department or healthcare provider. Check available hours when contacting your local health department.    For more information see CDC link below.      https://www.cdc.gov/coronavirus/2019-ncov/hcp/guidance-prevent-spread.html#precautions        Sources:  CDC, [place] [facility] and [facility] [place]    If you were prescribed a narcotic or controlled medication, do not drive or operate heavy equipment or machinery while taking these medications.  You must understand that you've received an Urgent Care treatment only and that you may be released before all your medical problems are known or treated. You, the patient, will arrange for follow up care as instructed.  Follow up with your PCP or specialty clinic as directed within 2-5 days if not improved or as needed.  You can call [phone] to schedule an appointment with the appropriate provider.  If your condition worsens we recommend that you receive another evaluation at the emergency room immediately or contact your primary medical clinics after hours call service to discuss your concerns.  Please return here or go to the Emergency Department for any concerns or worsening of condition.

## 2020-07-22 ENCOUNTER — TELEPHONE (OUTPATIENT)
Dept: URGENT CARE | Facility: CLINIC | Age: 30
End: 2020-07-22

## 2020-07-22 LAB — SARS-COV-2 RNA RESP QL NAA+PROBE: NOT DETECTED

## 2020-07-30 ENCOUNTER — HOSPITAL ENCOUNTER (OUTPATIENT)
Dept: RADIOLOGY | Facility: HOSPITAL | Age: 30
Discharge: HOME | End: 2020-07-30
Attending: OBSTETRICS & GYNECOLOGY
Payer: COMMERCIAL

## 2020-07-30 DIAGNOSIS — Z15.09 BRCA POSITIVE: ICD-10-CM

## 2020-07-30 DIAGNOSIS — Z15.01 BRCA POSITIVE: ICD-10-CM

## 2020-07-30 PROCEDURE — 76830 TRANSVAGINAL US NON-OB: CPT | Mod: 26,,, | Performed by: RADIOLOGY

## 2020-07-30 PROCEDURE — 76856 US PELVIS COMP WITH TRANSVAG NON-OB (XPD): ICD-10-PCS | Mod: 26,,, | Performed by: RADIOLOGY

## 2020-07-30 PROCEDURE — 76830 TRANSVAGINAL US NON-OB: CPT | Mod: TC

## 2020-07-30 PROCEDURE — 76830 US PELVIS COMP WITH TRANSVAG NON-OB (XPD): ICD-10-PCS | Mod: 26,,, | Performed by: RADIOLOGY

## 2020-07-30 PROCEDURE — 76856 US EXAM PELVIC COMPLETE: CPT | Mod: 26,,, | Performed by: RADIOLOGY

## 2020-10-05 ENCOUNTER — PATIENT MESSAGE (OUTPATIENT)
Dept: INTERNAL MEDICINE | Facility: CLINIC | Age: 30
End: 2020-10-05

## 2020-12-19 ENCOUNTER — PATIENT MESSAGE (OUTPATIENT)
Dept: FAMILY MEDICINE | Facility: CLINIC | Age: 30
End: 2020-12-19

## 2020-12-21 DIAGNOSIS — B07.0 PLANTAR WART: Primary | ICD-10-CM

## 2020-12-28 ENCOUNTER — IMMUNIZATION (OUTPATIENT)
Dept: INTERNAL MEDICINE | Facility: CLINIC | Age: 30
End: 2020-12-28
Payer: COMMERCIAL

## 2020-12-28 DIAGNOSIS — Z23 NEED FOR VACCINATION: ICD-10-CM

## 2020-12-28 PROCEDURE — 0001A COVID-19, MRNA, LNP-S, PF, 30 MCG/0.3 ML DOSE VACCINE: ICD-10-PCS | Mod: CV19,,, | Performed by: INTERNAL MEDICINE

## 2020-12-28 PROCEDURE — 0001A COVID-19, MRNA, LNP-S, PF, 30 MCG/0.3 ML DOSE VACCINE: CPT | Mod: CV19,,, | Performed by: INTERNAL MEDICINE

## 2020-12-28 PROCEDURE — 91300 COVID-19, MRNA, LNP-S, PF, 30 MCG/0.3 ML DOSE VACCINE: CPT | Mod: ,,, | Performed by: INTERNAL MEDICINE

## 2020-12-28 PROCEDURE — 91300 COVID-19, MRNA, LNP-S, PF, 30 MCG/0.3 ML DOSE VACCINE: ICD-10-PCS | Mod: ,,, | Performed by: INTERNAL MEDICINE

## 2021-01-14 ENCOUNTER — OFFICE VISIT (OUTPATIENT)
Dept: PODIATRY | Facility: CLINIC | Age: 31
End: 2021-01-14
Payer: COMMERCIAL

## 2021-01-14 VITALS
SYSTOLIC BLOOD PRESSURE: 101 MMHG | WEIGHT: 117.5 LBS | DIASTOLIC BLOOD PRESSURE: 66 MMHG | BODY MASS INDEX: 21.62 KG/M2 | HEIGHT: 62 IN | HEART RATE: 85 BPM

## 2021-01-14 DIAGNOSIS — Q82.8 POROKERATOSIS: Primary | ICD-10-CM

## 2021-01-14 DIAGNOSIS — B07.0 PLANTAR WART: ICD-10-CM

## 2021-01-14 DIAGNOSIS — M20.12 VALGUS DEFORMITY OF BOTH GREAT TOES: ICD-10-CM

## 2021-01-14 DIAGNOSIS — M20.11 VALGUS DEFORMITY OF BOTH GREAT TOES: ICD-10-CM

## 2021-01-14 PROCEDURE — 1125F PR PAIN SEVERITY QUANTIFIED, PAIN PRESENT: ICD-10-PCS | Mod: S$GLB,,, | Performed by: PODIATRIST

## 2021-01-14 PROCEDURE — 3008F BODY MASS INDEX DOCD: CPT | Mod: CPTII,S$GLB,, | Performed by: PODIATRIST

## 2021-01-14 PROCEDURE — 99999 PR PBB SHADOW E&M-EST. PATIENT-LVL III: CPT | Mod: PBBFAC,,, | Performed by: PODIATRIST

## 2021-01-14 PROCEDURE — 99203 PR OFFICE/OUTPT VISIT, NEW, LEVL III, 30-44 MIN: ICD-10-PCS | Mod: S$GLB,,, | Performed by: PODIATRIST

## 2021-01-14 PROCEDURE — 1125F AMNT PAIN NOTED PAIN PRSNT: CPT | Mod: S$GLB,,, | Performed by: PODIATRIST

## 2021-01-14 PROCEDURE — 3008F PR BODY MASS INDEX (BMI) DOCUMENTED: ICD-10-PCS | Mod: CPTII,S$GLB,, | Performed by: PODIATRIST

## 2021-01-14 PROCEDURE — 99203 OFFICE O/P NEW LOW 30 MIN: CPT | Mod: S$GLB,,, | Performed by: PODIATRIST

## 2021-01-14 PROCEDURE — 99999 PR PBB SHADOW E&M-EST. PATIENT-LVL III: ICD-10-PCS | Mod: PBBFAC,,, | Performed by: PODIATRIST

## 2021-01-18 ENCOUNTER — IMMUNIZATION (OUTPATIENT)
Dept: INTERNAL MEDICINE | Facility: CLINIC | Age: 31
End: 2021-01-18
Payer: COMMERCIAL

## 2021-01-18 DIAGNOSIS — Z23 NEED FOR VACCINATION: Primary | ICD-10-CM

## 2021-01-18 PROCEDURE — 91300 COVID-19, MRNA, LNP-S, PF, 30 MCG/0.3 ML DOSE VACCINE: CPT | Mod: PBBFAC | Performed by: INTERNAL MEDICINE

## 2021-01-18 PROCEDURE — 0002A COVID-19, MRNA, LNP-S, PF, 30 MCG/0.3 ML DOSE VACCINE: CPT | Mod: PBBFAC | Performed by: INTERNAL MEDICINE

## 2021-02-10 ENCOUNTER — TELEPHONE (OUTPATIENT)
Dept: ADMINISTRATIVE | Facility: OTHER | Age: 31
End: 2021-02-10

## 2021-02-12 ENCOUNTER — TELEPHONE (OUTPATIENT)
Dept: GYNECOLOGIC ONCOLOGY | Facility: CLINIC | Age: 31
End: 2021-02-12

## 2021-02-12 DIAGNOSIS — Z30.9 ENCOUNTER FOR CONTRACEPTIVE MANAGEMENT, UNSPECIFIED TYPE: ICD-10-CM

## 2021-02-12 RX ORDER — NORETHINDRONE AND ETHINYL ESTRADIOL AND FERROUS FUMARATE 0.4-35(21)
1 KIT ORAL DAILY
Qty: 28 EACH | Refills: 6 | Status: SHIPPED | OUTPATIENT
Start: 2021-02-12 | End: 2021-02-17 | Stop reason: SDUPTHER

## 2021-02-15 ENCOUNTER — PATIENT MESSAGE (OUTPATIENT)
Dept: GYNECOLOGIC ONCOLOGY | Facility: CLINIC | Age: 31
End: 2021-02-15

## 2021-02-15 DIAGNOSIS — Z30.9 ENCOUNTER FOR CONTRACEPTIVE MANAGEMENT, UNSPECIFIED TYPE: ICD-10-CM

## 2021-02-17 RX ORDER — NORETHINDRONE AND ETHINYL ESTRADIOL AND FERROUS FUMARATE 0.4-35(21)
1 KIT ORAL DAILY
Qty: 28 EACH | Refills: 3 | Status: SHIPPED | OUTPATIENT
Start: 2021-02-17 | End: 2021-02-17 | Stop reason: SDUPTHER

## 2021-02-17 RX ORDER — NORETHINDRONE AND ETHINYL ESTRADIOL AND FERROUS FUMARATE 0.4-35(21)
1 KIT ORAL DAILY
Qty: 84 EACH | Refills: 0 | Status: SHIPPED | OUTPATIENT
Start: 2021-02-17 | End: 2021-05-09

## 2021-02-22 ENCOUNTER — PATIENT MESSAGE (OUTPATIENT)
Dept: GYNECOLOGIC ONCOLOGY | Facility: CLINIC | Age: 31
End: 2021-02-22

## 2021-07-07 ENCOUNTER — PATIENT MESSAGE (OUTPATIENT)
Dept: ADMINISTRATIVE | Facility: HOSPITAL | Age: 31
End: 2021-07-07

## 2021-07-19 ENCOUNTER — PATIENT MESSAGE (OUTPATIENT)
Dept: GYNECOLOGIC ONCOLOGY | Facility: CLINIC | Age: 31
End: 2021-07-19

## 2021-10-04 ENCOUNTER — PATIENT MESSAGE (OUTPATIENT)
Dept: ADMINISTRATIVE | Facility: HOSPITAL | Age: 31
End: 2021-10-04

## 2022-05-30 ENCOUNTER — PATIENT MESSAGE (OUTPATIENT)
Dept: ADMINISTRATIVE | Facility: HOSPITAL | Age: 32
End: 2022-05-30